# Patient Record
Sex: MALE | Race: WHITE | Employment: OTHER | URBAN - METROPOLITAN AREA
[De-identification: names, ages, dates, MRNs, and addresses within clinical notes are randomized per-mention and may not be internally consistent; named-entity substitution may affect disease eponyms.]

---

## 2017-01-17 ENCOUNTER — APPOINTMENT (OUTPATIENT)
Dept: GENERAL RADIOLOGY | Age: 67
DRG: 287 | End: 2017-01-17
Attending: STUDENT IN AN ORGANIZED HEALTH CARE EDUCATION/TRAINING PROGRAM
Payer: MEDICARE

## 2017-01-17 ENCOUNTER — HOSPITAL ENCOUNTER (INPATIENT)
Age: 67
LOS: 2 days | Discharge: HOME | DRG: 287 | End: 2017-01-19
Attending: STUDENT IN AN ORGANIZED HEALTH CARE EDUCATION/TRAINING PROGRAM | Admitting: FAMILY MEDICINE
Payer: MEDICARE

## 2017-01-17 DIAGNOSIS — I21.4 NSTEMI (NON-ST ELEVATED MYOCARDIAL INFARCTION) (HCC): Primary | ICD-10-CM

## 2017-01-17 PROBLEM — I16.1 HYPERTENSIVE EMERGENCY: Status: ACTIVE | Noted: 2017-01-17

## 2017-01-17 LAB
ALBUMIN SERPL BCP-MCNC: 4 G/DL (ref 3.5–5)
ALBUMIN/GLOB SERPL: 1.1 {RATIO} (ref 1.1–2.2)
ALP SERPL-CCNC: 89 U/L (ref 45–117)
ALT SERPL-CCNC: 43 U/L (ref 12–78)
ANION GAP BLD CALC-SCNC: 12 MMOL/L (ref 5–15)
AST SERPL W P-5'-P-CCNC: 24 U/L (ref 15–37)
BASOPHILS # BLD AUTO: 0 K/UL (ref 0–0.1)
BASOPHILS # BLD: 0 % (ref 0–1)
BILIRUB SERPL-MCNC: 0.2 MG/DL (ref 0.2–1)
BUN SERPL-MCNC: 24 MG/DL (ref 6–20)
BUN/CREAT SERPL: 17 (ref 12–20)
CALCIUM SERPL-MCNC: 8.9 MG/DL (ref 8.5–10.1)
CHLORIDE SERPL-SCNC: 106 MMOL/L (ref 97–108)
CK SERPL-CCNC: 137 U/L (ref 39–308)
CO2 SERPL-SCNC: 22 MMOL/L (ref 21–32)
CREAT SERPL-MCNC: 1.41 MG/DL (ref 0.7–1.3)
EOSINOPHIL # BLD: 0.3 K/UL (ref 0–0.4)
EOSINOPHIL NFR BLD: 4 % (ref 0–7)
ERYTHROCYTE [DISTWIDTH] IN BLOOD BY AUTOMATED COUNT: 12.7 % (ref 11.5–14.5)
GLOBULIN SER CALC-MCNC: 3.7 G/DL (ref 2–4)
GLUCOSE SERPL-MCNC: 162 MG/DL (ref 65–100)
HCT VFR BLD AUTO: 39.5 % (ref 36.6–50.3)
HGB BLD-MCNC: 13.7 G/DL (ref 12.1–17)
LYMPHOCYTES # BLD AUTO: 21 % (ref 12–49)
LYMPHOCYTES # BLD: 1.5 K/UL (ref 0.8–3.5)
MCH RBC QN AUTO: 31.3 PG (ref 26–34)
MCHC RBC AUTO-ENTMCNC: 34.7 G/DL (ref 30–36.5)
MCV RBC AUTO: 90.2 FL (ref 80–99)
MONOCYTES # BLD: 0.7 K/UL (ref 0–1)
MONOCYTES NFR BLD AUTO: 10 % (ref 5–13)
NEUTS SEG # BLD: 4.7 K/UL (ref 1.8–8)
NEUTS SEG NFR BLD AUTO: 65 % (ref 32–75)
PLATELET # BLD AUTO: 193 K/UL (ref 150–400)
POTASSIUM SERPL-SCNC: 3.4 MMOL/L (ref 3.5–5.1)
PROT SERPL-MCNC: 7.7 G/DL (ref 6.4–8.2)
RBC # BLD AUTO: 4.38 M/UL (ref 4.1–5.7)
SODIUM SERPL-SCNC: 140 MMOL/L (ref 136–145)
TROPONIN I SERPL-MCNC: 0.12 NG/ML
WBC # BLD AUTO: 7.1 K/UL (ref 4.1–11.1)

## 2017-01-17 PROCEDURE — 71020 XR CHEST PA LAT: CPT

## 2017-01-17 PROCEDURE — 36415 COLL VENOUS BLD VENIPUNCTURE: CPT | Performed by: STUDENT IN AN ORGANIZED HEALTH CARE EDUCATION/TRAINING PROGRAM

## 2017-01-17 PROCEDURE — 74011250637 HC RX REV CODE- 250/637: Performed by: FAMILY MEDICINE

## 2017-01-17 PROCEDURE — 74011250636 HC RX REV CODE- 250/636: Performed by: FAMILY MEDICINE

## 2017-01-17 PROCEDURE — 80053 COMPREHEN METABOLIC PANEL: CPT | Performed by: STUDENT IN AN ORGANIZED HEALTH CARE EDUCATION/TRAINING PROGRAM

## 2017-01-17 PROCEDURE — 74011250636 HC RX REV CODE- 250/636: Performed by: STUDENT IN AN ORGANIZED HEALTH CARE EDUCATION/TRAINING PROGRAM

## 2017-01-17 PROCEDURE — 99285 EMERGENCY DEPT VISIT HI MDM: CPT

## 2017-01-17 PROCEDURE — 65610000006 HC RM INTENSIVE CARE

## 2017-01-17 PROCEDURE — 93005 ELECTROCARDIOGRAM TRACING: CPT

## 2017-01-17 PROCEDURE — 84484 ASSAY OF TROPONIN QUANT: CPT | Performed by: STUDENT IN AN ORGANIZED HEALTH CARE EDUCATION/TRAINING PROGRAM

## 2017-01-17 PROCEDURE — 82550 ASSAY OF CK (CPK): CPT | Performed by: STUDENT IN AN ORGANIZED HEALTH CARE EDUCATION/TRAINING PROGRAM

## 2017-01-17 PROCEDURE — 85025 COMPLETE CBC W/AUTO DIFF WBC: CPT | Performed by: STUDENT IN AN ORGANIZED HEALTH CARE EDUCATION/TRAINING PROGRAM

## 2017-01-17 RX ORDER — ONDANSETRON 2 MG/ML
4 INJECTION INTRAMUSCULAR; INTRAVENOUS
Status: DISCONTINUED | OUTPATIENT
Start: 2017-01-17 | End: 2017-01-19 | Stop reason: HOSPADM

## 2017-01-17 RX ORDER — METOPROLOL TARTRATE 25 MG/1
25 TABLET, FILM COATED ORAL 2 TIMES DAILY
Status: ON HOLD | COMMUNITY
End: 2017-01-19

## 2017-01-17 RX ORDER — ACETAMINOPHEN 325 MG/1
650 TABLET ORAL
Status: DISCONTINUED | OUTPATIENT
Start: 2017-01-17 | End: 2017-01-19 | Stop reason: HOSPADM

## 2017-01-17 RX ORDER — HYDRALAZINE HYDROCHLORIDE 20 MG/ML
10 INJECTION INTRAMUSCULAR; INTRAVENOUS
Status: DISCONTINUED | OUTPATIENT
Start: 2017-01-17 | End: 2017-01-19 | Stop reason: HOSPADM

## 2017-01-17 RX ORDER — PANTOPRAZOLE SODIUM 20 MG/1
20 TABLET, DELAYED RELEASE ORAL DAILY
COMMUNITY

## 2017-01-17 RX ORDER — PANTOPRAZOLE SODIUM 40 MG/1
40 TABLET, DELAYED RELEASE ORAL DAILY
Status: DISCONTINUED | OUTPATIENT
Start: 2017-01-18 | End: 2017-01-19 | Stop reason: HOSPADM

## 2017-01-17 RX ORDER — ASPIRIN 81 MG/1
81 TABLET ORAL DAILY
COMMUNITY

## 2017-01-17 RX ORDER — THERA TABS 400 MCG
1 TAB ORAL DAILY
COMMUNITY

## 2017-01-17 RX ORDER — HEPARIN SODIUM 5000 [USP'U]/ML
5000 INJECTION, SOLUTION INTRAVENOUS; SUBCUTANEOUS EVERY 8 HOURS
Status: DISCONTINUED | OUTPATIENT
Start: 2017-01-17 | End: 2017-01-18

## 2017-01-17 RX ORDER — SODIUM CHLORIDE 0.9 % (FLUSH) 0.9 %
5-10 SYRINGE (ML) INJECTION AS NEEDED
Status: DISCONTINUED | OUTPATIENT
Start: 2017-01-17 | End: 2017-01-19 | Stop reason: HOSPADM

## 2017-01-17 RX ORDER — ATORVASTATIN CALCIUM 10 MG/1
10 TABLET, FILM COATED ORAL
Status: DISCONTINUED | OUTPATIENT
Start: 2017-01-17 | End: 2017-01-19 | Stop reason: HOSPADM

## 2017-01-17 RX ORDER — MULTIVIT WITH MINERALS/HERBS
1 TABLET ORAL DAILY
COMMUNITY

## 2017-01-17 RX ORDER — HYDROCODONE BITARTRATE AND ACETAMINOPHEN 5; 325 MG/1; MG/1
1 TABLET ORAL
Status: DISCONTINUED | OUTPATIENT
Start: 2017-01-17 | End: 2017-01-19 | Stop reason: HOSPADM

## 2017-01-17 RX ORDER — SODIUM CHLORIDE 0.9 % (FLUSH) 0.9 %
5-10 SYRINGE (ML) INJECTION EVERY 8 HOURS
Status: DISCONTINUED | OUTPATIENT
Start: 2017-01-17 | End: 2017-01-19 | Stop reason: HOSPADM

## 2017-01-17 RX ORDER — ATORVASTATIN CALCIUM 20 MG/1
10 TABLET, FILM COATED ORAL
COMMUNITY

## 2017-01-17 RX ORDER — METOPROLOL TARTRATE 50 MG/1
50 TABLET ORAL 2 TIMES DAILY
Status: DISCONTINUED | OUTPATIENT
Start: 2017-01-18 | End: 2017-01-18

## 2017-01-17 RX ORDER — METOPROLOL TARTRATE 25 MG/1
25 TABLET, FILM COATED ORAL 2 TIMES DAILY
Status: DISCONTINUED | OUTPATIENT
Start: 2017-01-18 | End: 2017-01-17

## 2017-01-17 RX ORDER — MORPHINE SULFATE 2 MG/ML
2 INJECTION, SOLUTION INTRAMUSCULAR; INTRAVENOUS
Status: DISCONTINUED | OUTPATIENT
Start: 2017-01-17 | End: 2017-01-19 | Stop reason: HOSPADM

## 2017-01-17 RX ORDER — SODIUM CHLORIDE 9 MG/ML
75 INJECTION, SOLUTION INTRAVENOUS CONTINUOUS
Status: DISPENSED | OUTPATIENT
Start: 2017-01-17 | End: 2017-01-18

## 2017-01-17 RX ORDER — AMLODIPINE BESYLATE 5 MG/1
5 TABLET ORAL DAILY
Status: DISCONTINUED | OUTPATIENT
Start: 2017-01-18 | End: 2017-01-18

## 2017-01-17 RX ORDER — POTASSIUM CHLORIDE 750 MG/1
20 TABLET, FILM COATED, EXTENDED RELEASE ORAL DAILY
Status: DISCONTINUED | OUTPATIENT
Start: 2017-01-17 | End: 2017-01-19

## 2017-01-17 RX ORDER — ASPIRIN 81 MG/1
81 TABLET ORAL DAILY
Status: DISCONTINUED | OUTPATIENT
Start: 2017-01-18 | End: 2017-01-19 | Stop reason: HOSPADM

## 2017-01-17 RX ADMIN — POTASSIUM CHLORIDE 20 MEQ: 750 TABLET, FILM COATED, EXTENDED RELEASE ORAL at 23:34

## 2017-01-17 RX ADMIN — HEPARIN SODIUM 5000 UNITS: 5000 INJECTION, SOLUTION INTRAVENOUS; SUBCUTANEOUS at 23:33

## 2017-01-17 RX ADMIN — SODIUM CHLORIDE 75 ML/HR: 900 INJECTION, SOLUTION INTRAVENOUS at 23:34

## 2017-01-17 RX ADMIN — SODIUM CHLORIDE 1000 ML: 900 INJECTION, SOLUTION INTRAVENOUS at 22:20

## 2017-01-17 RX ADMIN — NITROGLYCERIN 1 INCH: 20 OINTMENT TOPICAL at 23:33

## 2017-01-17 RX ADMIN — ATORVASTATIN CALCIUM 10 MG: 10 TABLET, FILM COATED ORAL at 23:33

## 2017-01-17 NOTE — IP AVS SNAPSHOT
[address] 
[phone] Patient: Joe Malone MRN: [id number] HUV:[id number] You are allergic to the following Allergen Reactions Egg Hives \"egg whites\" Recent Documentation Height Weight BMI Smoking Status 1.727 m 93.6 kg 31.38 kg/m2 Current Some Day Smoker Emergency Contacts Name Discharge Info Relation Home Work Mobile Laya Angel DISCHARGE CAREGIVER [3] Daughter [21] [phone] About your hospitalization You were admitted on:  January 17, 2017 You last received care in the:  [facility] 4 IMCU 2 You were discharged on:  January 19, 2017 Unit phone number:  [phone] Why you were hospitalized Your primary diagnosis was:  Not on File Your diagnoses also included:  Hypertensive Emergency Providers Seen During Your Hospitalizations Provider Role Specialty Primary office phone Amanda Bradford MD Attending Provider Emergency Medicine [phone] Darrick Salcido MD Attending Provider [facility] [phone] Nargis Fleming MD Attending Provider Hospitalist [phone] Your Primary Care Physician (PCP) Primary Care Physician Office Phone Office Fax Geeta Hanson [phone] Follow-up Information Follow up With Details Comments Contact Info Dedra Cummins MD Schedule an appointment as soon as possible for a visit in 1 week  Ifeanyi Bowman 25 100 [facility] [address] [place] [address] 
[phone] [facility]  Call [facility] to make an appointment to re-enroll in program. [phone] Current Discharge Medication List  
  
START taking these medications Dose & Instructions Dispensing Information Comments Morning Noon Evening Bedtime ALPRAZolam 0.5 mg tablet Commonly known as:  Yoan Ferraro Your next dose is: Today, Tomorrow Other:  _________ Dose:  0.5 mg Take 1 Tab by mouth two (2) times daily as needed for Anxiety. Max Daily Amount: 1 mg. Quantity:  12 Tab Refills:  0  
     
   
   
   
  
 amLODIPine 10 mg tablet Commonly known as:  Marti Lisseth Your next dose is: Today, Tomorrow Other:  _________ Dose:  10 mg Take 1 Tab by mouth daily. Quantity:  30 Tab Refills:  0  
     
   
   
   
  
 carvedilol 12.5 mg tablet Commonly known as:  Aidan Gut Your next dose is: Today, Tomorrow Other:  _________ Dose:  12.5 mg Take 1 Tab by mouth every twelve (12) hours. Quantity:  60 Tab Refills:  0 CONTINUE these medications which have NOT CHANGED Dose & Instructions Dispensing Information Comments Morning Noon Evening Bedtime  
 aspirin delayed-release 81 mg tablet Your next dose is: Today, Tomorrow Other:  _________ Dose:  81 mg Take 81 mg by mouth daily. Refills:  0  
     
   
   
   
  
 atorvastatin 20 mg tablet Commonly known as:  LIPITOR Your next dose is: Today, Tomorrow Other:  _________ Dose:  10 mg Take 10 mg by mouth nightly. Refills:  0  
     
   
   
   
  
 B COMPLEX 1 tablet Generic drug:  b complex vitamins Your next dose is: Today, Tomorrow Other:  _________ Dose:  1 Tab Take 1 Tab by mouth daily. Refills:  0  
     
   
   
   
  
 fish oil-omega-3 fatty acids 340-1,000 mg capsule Your next dose is: Today, Tomorrow Other:  _________ Dose:  1 Cap Take 1 Cap by mouth daily. Refills:  0  
     
   
   
   
  
 pantoprazole 20 mg tablet Commonly known as:  PROTONIX Your next dose is: Today, Tomorrow Other:  _________ Dose:  20 mg Take 20 mg by mouth daily. Refills:  0 THERA tablet Generic drug:  therapeutic multivitamin Your next dose is: Today, Tomorrow Other:  _________ Dose:  1 Tab Take 1 Tab by mouth daily. Refills:  0 STOP taking these medications   
 metoprolol tartrate 25 mg tablet Commonly known as:  LOPRESSOR Where to Get Your Medications Information on where to get these meds will be given to you by the nurse or doctor. ! Ask your nurse or doctor about these medications ALPRAZolam 0.5 mg tablet  
 amLODIPine 10 mg tablet  
 carvedilol 12.5 mg tablet Discharge Instructions Discharge Instructions PATIENT ID: Kareen Jade MRN: [id number] YOB: 1950 DATE OF ADMISSION: 1/17/2017  7:35 PM   
DATE OF DISCHARGE: 1/19/2017 PRIMARY CARE PROVIDER: Filipe Cash MD  
 
ATTENDING PHYSICIAN: Mitzie Boeck, MD 
DISCHARGING PROVIDER: Mitzie Boeck, MD   
To contact this individual call [phone] and ask the  to page. If unavailable ask to be transferred the Adult Hospitalist Department. DISCHARGE DIAGNOSES hypertension CONSULTATIONS: IP CONSULT TO CARDIOLOGY 
IP CONSULT TO CARDIOLOGY 
IP CONSULT TO HOSPITALIST 
 
PROCEDURES/SURGERIES: * No surgery found * PENDING TEST RESULTS:  
At the time of discharge the following test results are still pending: n/a FOLLOW UP APPOINTMENTS:  
Follow-up Information Follow up With Details Comments Contact Info Filipe Cevallos MD Schedule an appointment as soon as possible for a visit in 1 week  Juju Jensen 100 [facility] [address] [place] [address] 
[phone] ADDITIONAL CARE RECOMMENDATIONS:  
You were prescribed a new blood pressure medication, amlodipine. Your metoprolol dose was increased.  
It's important to take your blood pressure at least once a day during rest and keep a log of your readings. Follow-up with your doctor to adjust the medications as needed. DIET: Cardiac Diet ACTIVITY: Activity as tolerated, no heavy lifting for the next 2 days DISCHARGE MEDICATIONS: 
 See Medication Reconciliation Form · It is important that you take the medication exactly as they are prescribed. · Keep your medication in the bottles provided by the pharmacist and keep a list of the medication names, dosages, and times to be taken in your wallet. · Do not take other medications without consulting your doctor. NOTIFY YOUR PHYSICIAN FOR ANY OF THE FOLLOWING:  
Fever over 101 degrees for 24 hours. Chest pain, shortness of breath, fever, chills, nausea, vomiting, diarrhea, change in mentation, falling, weakness, bleeding. Severe pain or pain not relieved by medications. Or, any other signs or symptoms that you may have questions about. DISPOSITION: 
x  Home With: 
 OT  PT  [facility]  RN  
  
 SNF/Inpatient Rehab/LTAC Independent/assisted living Hospice Other: CDMP Checked:  
Yes x PROBLEM LIST Updated: 
Yes x Signed:  
Tona Ratliff MD 
1/19/2017 
10:16 AM 
 
 
Discharge Orders None INCIDE Announcement We are excited to announce that we are making your provider's discharge notes available to you in INCIDE. You will see these notes when they are completed and signed by the physician that discharged you from your recent hospital stay. If you have any questions or concerns about any information you see in INCIDE, please call the Health Information Department where you were seen or reach out to your Primary Care Provider for more information about your plan of care. Introducing [facility]! Cristy Mcdowell introduces INCIDE patient portal. Now you can access parts of your medical record, email your doctor's office, and request medication refills online.    
 
1. In your internet browser, go to [email or web address] 2. Click on the First Time User? Click Here link in the Sign In box. You will see the New Member Sign Up page. 3. Enter your Kaiima Access Code exactly as it appears below. You will not need to use this code after youve completed the sign-up process. If you do not sign up before the expiration date, you must request a new code. · Kaiima Access Code: [email or web address] Expires: 4/17/2017  7:57 PM 
 
4. Enter the last four digits of your Social Security Number (xxxx) and Date of Birth (mm/dd/yyyy) as indicated and click Submit. You will be taken to the next sign-up page. 5. Create a Kaiima ID. This will be your Kaiima login ID and cannot be changed, so think of one that is secure and easy to remember. 6. Create a Kaiima password. You can change your password at any time. 7. Enter your Password Reset Question and Answer. This can be used at a later time if you forget your password. 8. Enter your e-mail address. You will receive e-mail notification when new information is available in [address] 9. Click Sign Up. You can now view and download portions of your medical record. 10. Click the Download Summary menu link to download a portable copy of your medical information. If you have questions, please visit the Frequently Asked Questions section of the Kaiima website. Remember, Kaiima is NOT to be used for urgent needs. For medical emergencies, dial 911. Now available from your iPhone and Android! General Information Please provide this summary of care documentation to your next provider. Patient Signature:  ____________________________________________________________ Date:  ____________________________________________________________  
  
[facility] [place] Provider Signature:  ____________________________________________________________ Date:  ____________________________________________________________

## 2017-01-18 LAB
ANION GAP BLD CALC-SCNC: 7 MMOL/L (ref 5–15)
ATRIAL RATE: 88 BPM
BASOPHILS # BLD AUTO: 0 K/UL (ref 0–0.1)
BASOPHILS # BLD: 1 % (ref 0–1)
BUN SERPL-MCNC: 20 MG/DL (ref 6–20)
BUN/CREAT SERPL: 16 (ref 12–20)
CALCIUM SERPL-MCNC: 8.5 MG/DL (ref 8.5–10.1)
CALCULATED P AXIS, ECG09: 54 DEGREES
CALCULATED R AXIS, ECG10: 22 DEGREES
CALCULATED T AXIS, ECG11: 67 DEGREES
CHLORIDE SERPL-SCNC: 109 MMOL/L (ref 97–108)
CHOLEST SERPL-MCNC: 114 MG/DL
CO2 SERPL-SCNC: 24 MMOL/L (ref 21–32)
CREAT SERPL-MCNC: 1.24 MG/DL (ref 0.7–1.3)
CRP SERPL-MCNC: <0.29 MG/DL (ref 0–0.6)
DIAGNOSIS, 93000: NORMAL
EOSINOPHIL # BLD: 0.3 K/UL (ref 0–0.4)
EOSINOPHIL NFR BLD: 4 % (ref 0–7)
ERYTHROCYTE [DISTWIDTH] IN BLOOD BY AUTOMATED COUNT: 12.8 % (ref 11.5–14.5)
GLUCOSE SERPL-MCNC: 117 MG/DL (ref 65–100)
HCT VFR BLD AUTO: 36.5 % (ref 36.6–50.3)
HDLC SERPL-MCNC: 49 MG/DL
HDLC SERPL: 2.3 {RATIO} (ref 0–5)
HGB BLD-MCNC: 12.5 G/DL (ref 12.1–17)
LDLC SERPL CALC-MCNC: 51.6 MG/DL (ref 0–100)
LIPID PROFILE,FLP: NORMAL
LYMPHOCYTES # BLD AUTO: 34 % (ref 12–49)
LYMPHOCYTES # BLD: 2.2 K/UL (ref 0.8–3.5)
MCH RBC QN AUTO: 31.2 PG (ref 26–34)
MCHC RBC AUTO-ENTMCNC: 34.2 G/DL (ref 30–36.5)
MCV RBC AUTO: 91 FL (ref 80–99)
MONOCYTES # BLD: 0.4 K/UL (ref 0–1)
MONOCYTES NFR BLD AUTO: 7 % (ref 5–13)
NEUTS SEG # BLD: 3.5 K/UL (ref 1.8–8)
NEUTS SEG NFR BLD AUTO: 54 % (ref 32–75)
P-R INTERVAL, ECG05: 144 MS
PLATELET # BLD AUTO: 183 K/UL (ref 150–400)
POTASSIUM SERPL-SCNC: 3.9 MMOL/L (ref 3.5–5.1)
Q-T INTERVAL, ECG07: 376 MS
QRS DURATION, ECG06: 94 MS
QTC CALCULATION (BEZET), ECG08: 454 MS
RBC # BLD AUTO: 4.01 M/UL (ref 4.1–5.7)
SODIUM SERPL-SCNC: 140 MMOL/L (ref 136–145)
TRIGL SERPL-MCNC: 67 MG/DL (ref ?–150)
TROPONIN I SERPL-MCNC: 3.15 NG/ML
VENTRICULAR RATE, ECG03: 88 BPM
VLDLC SERPL CALC-MCNC: 13.4 MG/DL
WBC # BLD AUTO: 6.4 K/UL (ref 4.1–11.1)

## 2017-01-18 PROCEDURE — 4A023N7 MEASUREMENT OF CARDIAC SAMPLING AND PRESSURE, LEFT HEART, PERCUTANEOUS APPROACH: ICD-10-PCS | Performed by: INTERNAL MEDICINE

## 2017-01-18 PROCEDURE — 80061 LIPID PANEL: CPT | Performed by: FAMILY MEDICINE

## 2017-01-18 PROCEDURE — C1760 CLOSURE DEV, VASC: HCPCS

## 2017-01-18 PROCEDURE — 99152 MOD SED SAME PHYS/QHP 5/>YRS: CPT

## 2017-01-18 PROCEDURE — 74011250636 HC RX REV CODE- 250/636: Performed by: INTERNAL MEDICINE

## 2017-01-18 PROCEDURE — C1894 INTRO/SHEATH, NON-LASER: HCPCS

## 2017-01-18 PROCEDURE — 74011000250 HC RX REV CODE- 250: Performed by: INTERNAL MEDICINE

## 2017-01-18 PROCEDURE — 36415 COLL VENOUS BLD VENIPUNCTURE: CPT | Performed by: FAMILY MEDICINE

## 2017-01-18 PROCEDURE — 77030013744

## 2017-01-18 PROCEDURE — B2181ZZ FLUOROSCOPY OF LEFT INTERNAL MAMMARY BYPASS GRAFT USING LOW OSMOLAR CONTRAST: ICD-10-PCS | Performed by: INTERNAL MEDICINE

## 2017-01-18 PROCEDURE — 74011250637 HC RX REV CODE- 250/637: Performed by: INTERNAL MEDICINE

## 2017-01-18 PROCEDURE — 84484 ASSAY OF TROPONIN QUANT: CPT | Performed by: FAMILY MEDICINE

## 2017-01-18 PROCEDURE — 65660000000 HC RM CCU STEPDOWN

## 2017-01-18 PROCEDURE — 77030004533 HC CATH ANGI DX IMP BSC -B

## 2017-01-18 PROCEDURE — B4181ZZ FLUOROSCOPY OF BILATERAL RENAL ARTERIES USING LOW OSMOLAR CONTRAST: ICD-10-PCS | Performed by: INTERNAL MEDICINE

## 2017-01-18 PROCEDURE — 86140 C-REACTIVE PROTEIN: CPT | Performed by: FAMILY MEDICINE

## 2017-01-18 PROCEDURE — 74011250637 HC RX REV CODE- 250/637: Performed by: HOSPITALIST

## 2017-01-18 PROCEDURE — 74011250637 HC RX REV CODE- 250/637: Performed by: FAMILY MEDICINE

## 2017-01-18 PROCEDURE — B2131ZZ FLUOROSCOPY OF MULTIPLE CORONARY ARTERY BYPASS GRAFTS USING LOW OSMOLAR CONTRAST: ICD-10-PCS | Performed by: INTERNAL MEDICINE

## 2017-01-18 PROCEDURE — 85025 COMPLETE CBC W/AUTO DIFF WBC: CPT | Performed by: FAMILY MEDICINE

## 2017-01-18 PROCEDURE — 80048 BASIC METABOLIC PNL TOTAL CA: CPT | Performed by: FAMILY MEDICINE

## 2017-01-18 PROCEDURE — B2111ZZ FLUOROSCOPY OF MULTIPLE CORONARY ARTERIES USING LOW OSMOLAR CONTRAST: ICD-10-PCS | Performed by: INTERNAL MEDICINE

## 2017-01-18 PROCEDURE — 74011636320 HC RX REV CODE- 636/320: Performed by: INTERNAL MEDICINE

## 2017-01-18 RX ORDER — SODIUM CHLORIDE 9 MG/ML
1.5 INJECTION, SOLUTION INTRAVENOUS CONTINUOUS
Status: DISPENSED | OUTPATIENT
Start: 2017-01-18 | End: 2017-01-18

## 2017-01-18 RX ORDER — HYDRALAZINE HYDROCHLORIDE 20 MG/ML
10 INJECTION INTRAMUSCULAR; INTRAVENOUS ONCE
Status: COMPLETED | OUTPATIENT
Start: 2017-01-18 | End: 2017-01-18

## 2017-01-18 RX ORDER — SODIUM CHLORIDE 0.9 % (FLUSH) 0.9 %
5-10 SYRINGE (ML) INJECTION AS NEEDED
Status: DISCONTINUED | OUTPATIENT
Start: 2017-01-18 | End: 2017-01-18

## 2017-01-18 RX ORDER — SODIUM CHLORIDE 0.9 % (FLUSH) 0.9 %
5-10 SYRINGE (ML) INJECTION AS NEEDED
Status: DISCONTINUED | OUTPATIENT
Start: 2017-01-18 | End: 2017-01-19 | Stop reason: HOSPADM

## 2017-01-18 RX ORDER — GUAIFENESIN 100 MG/5ML
81 LIQUID (ML) ORAL
Status: COMPLETED | OUTPATIENT
Start: 2017-01-18 | End: 2017-01-18

## 2017-01-18 RX ORDER — METOPROLOL TARTRATE 50 MG/1
50 TABLET ORAL EVERY 12 HOURS
Status: DISCONTINUED | OUTPATIENT
Start: 2017-01-18 | End: 2017-01-19

## 2017-01-18 RX ORDER — HEPARIN SODIUM 200 [USP'U]/100ML
2000 INJECTION, SOLUTION INTRAVENOUS AS NEEDED
Status: DISCONTINUED | OUTPATIENT
Start: 2017-01-18 | End: 2017-01-18

## 2017-01-18 RX ORDER — SODIUM CHLORIDE 0.9 % (FLUSH) 0.9 %
5-10 SYRINGE (ML) INJECTION EVERY 8 HOURS
Status: DISCONTINUED | OUTPATIENT
Start: 2017-01-18 | End: 2017-01-19 | Stop reason: HOSPADM

## 2017-01-18 RX ORDER — LIDOCAINE HYDROCHLORIDE 10 MG/ML
10-30 INJECTION INFILTRATION; PERINEURAL
Status: DISCONTINUED | OUTPATIENT
Start: 2017-01-18 | End: 2017-01-18

## 2017-01-18 RX ORDER — HEPARIN SODIUM 1000 [USP'U]/ML
1000-5000 INJECTION, SOLUTION INTRAVENOUS; SUBCUTANEOUS
Status: DISCONTINUED | OUTPATIENT
Start: 2017-01-18 | End: 2017-01-18

## 2017-01-18 RX ORDER — SODIUM CHLORIDE 0.9 % (FLUSH) 0.9 %
5-10 SYRINGE (ML) INJECTION EVERY 8 HOURS
Status: DISCONTINUED | OUTPATIENT
Start: 2017-01-18 | End: 2017-01-18

## 2017-01-18 RX ORDER — FENTANYL CITRATE 50 UG/ML
25-200 INJECTION, SOLUTION INTRAMUSCULAR; INTRAVENOUS
Status: DISCONTINUED | OUTPATIENT
Start: 2017-01-18 | End: 2017-01-18

## 2017-01-18 RX ORDER — MIDAZOLAM HYDROCHLORIDE 1 MG/ML
.5-1 INJECTION, SOLUTION INTRAMUSCULAR; INTRAVENOUS
Status: DISCONTINUED | OUTPATIENT
Start: 2017-01-18 | End: 2017-01-18

## 2017-01-18 RX ORDER — ATROPINE SULFATE 0.1 MG/ML
.5-1 INJECTION INTRAVENOUS AS NEEDED
Status: DISCONTINUED | OUTPATIENT
Start: 2017-01-18 | End: 2017-01-18

## 2017-01-18 RX ORDER — SODIUM CHLORIDE 9 MG/ML
3 INJECTION, SOLUTION INTRAVENOUS CONTINUOUS
Status: DISPENSED | OUTPATIENT
Start: 2017-01-18 | End: 2017-01-18

## 2017-01-18 RX ORDER — METOPROLOL TARTRATE 5 MG/5ML
5 INJECTION INTRAVENOUS ONCE
Status: COMPLETED | OUTPATIENT
Start: 2017-01-18 | End: 2017-01-18

## 2017-01-18 RX ORDER — AMLODIPINE BESYLATE 5 MG/1
10 TABLET ORAL DAILY
Status: DISCONTINUED | OUTPATIENT
Start: 2017-01-18 | End: 2017-01-19 | Stop reason: HOSPADM

## 2017-01-18 RX ORDER — CLOPIDOGREL 300 MG/1
600 TABLET, FILM COATED ORAL ONCE
Status: DISCONTINUED | OUTPATIENT
Start: 2017-01-18 | End: 2017-01-18

## 2017-01-18 RX ADMIN — AMLODIPINE BESYLATE 10 MG: 5 TABLET ORAL at 08:40

## 2017-01-18 RX ADMIN — PANTOPRAZOLE SODIUM 40 MG: 40 TABLET, DELAYED RELEASE ORAL at 08:40

## 2017-01-18 RX ADMIN — SODIUM CHLORIDE 1.5 ML/KG/HR: 900 INJECTION, SOLUTION INTRAVENOUS at 07:48

## 2017-01-18 RX ADMIN — ATORVASTATIN CALCIUM 10 MG: 10 TABLET, FILM COATED ORAL at 21:07

## 2017-01-18 RX ADMIN — METOPROLOL TARTRATE 50 MG: 50 TABLET ORAL at 21:07

## 2017-01-18 RX ADMIN — Medication 81 MG: at 07:02

## 2017-01-18 RX ADMIN — IOPAMIDOL 1226 ML: 755 INJECTION, SOLUTION INTRAVENOUS at 07:28

## 2017-01-18 RX ADMIN — METOPROLOL TARTRATE 5 MG: 5 INJECTION INTRAVENOUS at 07:08

## 2017-01-18 RX ADMIN — MIDAZOLAM HYDROCHLORIDE 2 MG: 1 INJECTION, SOLUTION INTRAMUSCULAR; INTRAVENOUS at 07:18

## 2017-01-18 RX ADMIN — SODIUM CHLORIDE 3 ML/KG/HR: 900 INJECTION, SOLUTION INTRAVENOUS at 06:55

## 2017-01-18 RX ADMIN — FENTANYL CITRATE 50 MCG: 50 INJECTION, SOLUTION INTRAMUSCULAR; INTRAVENOUS at 07:07

## 2017-01-18 RX ADMIN — MIDAZOLAM HYDROCHLORIDE 2 MG: 1 INJECTION, SOLUTION INTRAMUSCULAR; INTRAVENOUS at 07:07

## 2017-01-18 RX ADMIN — HEPARIN SODIUM 2000 UNITS: 200 INJECTION, SOLUTION INTRAVENOUS at 07:04

## 2017-01-18 RX ADMIN — SODIUM CHLORIDE 1.5 ML/KG/HR: 900 INJECTION, SOLUTION INTRAVENOUS at 09:59

## 2017-01-18 RX ADMIN — METOPROLOL TARTRATE 50 MG: 50 TABLET ORAL at 08:40

## 2017-01-18 RX ADMIN — LIDOCAINE HYDROCHLORIDE 10 ML: 10 INJECTION, SOLUTION INFILTRATION; PERINEURAL at 07:04

## 2017-01-18 RX ADMIN — HYDRALAZINE HYDROCHLORIDE 10 MG: 20 INJECTION INTRAMUSCULAR; INTRAVENOUS at 07:14

## 2017-01-18 NOTE — PROGRESS NOTES
Admission Medication Reconciliation:    Information obtained from: Patient    Significant PMH/Disease States:   Past Medical History   Diagnosis Date    CAD (coronary artery disease)     Hypertension        Chief Complaint for this Admission:    Chief Complaint   Patient presents with    Dizziness    Hypertension       Allergies:  Egg    Prior to Admission Medications:   Prior to Admission Medications   Prescriptions Last Dose Informant Patient Reported? Taking?   aspirin delayed-release 81 mg tablet 1/17/2017 at Unknown time  Yes Yes   Sig: Take 81 mg by mouth daily. atorvastatin (LIPITOR) 20 mg tablet 1/16/2017 at Unknown time  Yes Yes   Sig: Take 10 mg by mouth nightly. b complex vitamins (B COMPLEX 1) tablet 1/17/2017 at Unknown time  Yes Yes   Sig: Take 1 Tab by mouth daily. fish oil-omega-3 fatty acids 340-1,000 mg capsule   Yes Yes   Sig: Take 1 Cap by mouth daily. metoprolol tartrate (LOPRESSOR) 25 mg tablet 1/17/2017 at am  Yes Yes   Sig: Take 25 mg by mouth two (2) times a day. pantoprazole (PROTONIX) 20 mg tablet 1/17/2017 at Unknown time  Yes Yes   Sig: Take 20 mg by mouth daily. therapeutic multivitamin (THERA) tablet   Yes Yes   Sig: Take 1 Tab by mouth daily. Facility-Administered Medications: None         Comments/Recommendations:     Spoke with patient regarding allergies and PTA medications. 1) Reviewed and confirmed allergy. 2) Updated PTA medication list. Added ASA, atorvastatin, B complex, fish oil, metoprolol, pantoprazole, and multivitamin. Patient took doses of ASA, B complex, metoprolol (1 dose), and pantoprazole today.       Rosalva Cha, PharmD

## 2017-01-18 NOTE — ED NOTES
Daughter is Burak Mejia, [phone]; daughter will be waiting for patient once cardiac cath is completed

## 2017-01-18 NOTE — PROGRESS NOTES
Hospitalist Progress Note      Hospital summary: 74 y.o man with HTN, CAD s/p CABG, who presented with hypertensive urgency. Assessment/Plan:  Hypertensive urgency: continue metoprolol and amlodipine    Elevated troponin: s/p LHC showing 70% LM lesion, see report, cardiology following    KAYLEIGH: improving with IV fluids    Code status: full  DVT prophylaxis: SCDs, ambulate  Disposition: home likely tomorrow  ----------------------------------------------    CC: hypertension    S: no chest pain, dyspnea, headache, dizziness, n/v/d. Review of Systems:  Pertinent items are noted in HPI.     O:  Visit Vitals    /53 (BP 1 Location: Right arm, BP Patient Position: At rest)    Pulse 74    Temp 97.9 °F (36.6 °C)    Resp 18    Ht 5' 8\" (1.727 m)    Wt 90.7 kg (200 lb)    SpO2 98%    BMI 30.41 kg/m2       PHYSICAL EXAM:  Gen: NAD, non-toxic  HEENT: anicteric sclerae, normal conjunctiva  Neck: supple, trachea midline, no adenopathy  Heart: RRR, systolic murmur, no JVD, no peripheral edema  Lungs: CTA b/l, non-labored respirations  Abd: soft, NT, ND, BS+  Extr: warm, non-edematous  Neuro: grossly non-focal  Psych: normal mood, appropriate affect      Intake/Output Summary (Last 24 hours) at 01/18/17 1421  Last data filed at 01/18/17 0958   Gross per 24 hour   Intake              240 ml   Output              325 ml   Net              -85 ml        Recent labs & imaging reviewed:  Recent Labs      01/18/17 0351 01/17/17 1955   WBC  6.4  7.1   HGB  12.5  13.7   HCT  36.5*  39.5   PLT  183  193     Recent Labs      01/18/17   0351 01/17/17 1955   NA  140  140   K  3.9  3.4*   CL  109*  106   CO2  24  22   BUN  20  24*   CREA  1.24  1.41*   GLU  117*  162*   CA  8.5  8.9     Recent Labs      01/17/17 1955   SGOT  24   ALT  43   AP  89   TBILI  0.2   TP  7.7   ALB  4.0   GLOB  3.7       Med list reviewed  Current Facility-Administered Medications   Medication Dose Route Frequency    sodium chloride (NS) flush 5-10 mL  5-10 mL IntraVENous Q8H    sodium chloride (NS) flush 5-10 mL  5-10 mL IntraVENous PRN    amLODIPine (NORVASC) tablet 10 mg  10 mg Oral DAILY    aspirin delayed-release tablet 81 mg  81 mg Oral DAILY    atorvastatin (LIPITOR) tablet 10 mg  10 mg Oral QHS    pantoprazole (PROTONIX) tablet 40 mg  40 mg Oral DAILY    sodium chloride (NS) flush 5-10 mL  5-10 mL IntraVENous Q8H    sodium chloride (NS) flush 5-10 mL  5-10 mL IntraVENous PRN    acetaminophen (TYLENOL) tablet 650 mg  650 mg Oral Q4H PRN    HYDROcodone-acetaminophen (NORCO) 5-325 mg per tablet 1 Tab  1 Tab Oral Q4H PRN    morphine injection 2 mg  2 mg IntraVENous Q4H PRN    ondansetron (ZOFRAN) injection 4 mg  4 mg IntraVENous Q4H PRN    hydrALAZINE (APRESOLINE) 20 mg/mL injection 10 mg  10 mg IntraVENous Q4H PRN    0.9% sodium chloride infusion  75 mL/hr IntraVENous CONTINUOUS    metoprolol tartrate (LOPRESSOR) tablet 50 mg  50 mg Oral BID    potassium chloride SR (KLOR-CON 10) tablet 20 mEq  20 mEq Oral DAILY       Care Plan discussed with:  Patient/Family    Jesus Ortiz MD  Internal Medicine  Date of Service: 1/18/2017

## 2017-01-18 NOTE — ED TRIAGE NOTES
Pt was at home and states that about an hour ago he began to feel dizzy and light headed, ,pt states that he felt his pressure was up and that he was having palpitations. Upon EMS arrival his BP was 230/180 and . Pt denies any chest pain or SOB.

## 2017-01-18 NOTE — PROGRESS NOTES
Patient assisted to ambulate to bathroom to void; exhibits steady gait. Right groin site remains dry & intact.

## 2017-01-18 NOTE — CARDIO/PULMONARY
Cardiac Wellness: CAD education folder to bedside of Shane Damian. Met with Shane Damian and his daughter to provide education. Pt. stated that Dr Sandra Armijo did say he had some heart muscle damage so Tori MI education added to folder. Educated using teach back method. Reviewed CAD diagnosis definition and purpose of intervention. This is a previous diagnosis for Shane Damian, he has a excellent understanding of CAD diagnosis and treatment. Identified pertinent CAD risk factors including, previous CABG, HTN, high cholesterol, metabolic syndrome. Patient is a cigar smoker when he golfs which is about 3 x/week. Smoking cessation material offered but declined. Reviewed importance of medication compliance and follow up appointments with cardiologist.  Discussed purpose of amlodipine and potential side effects, signs and symptoms to report to physician after discharge. Emphasized value of cardiac rehab, discussed Cardiac Wellness Program and encouraged enrollment. Shane Dmaian is in the Phase 3 program at [facility] where he has attended since his CABG . Did advise that he get clearance to return to Cardiac [facility] at his cardiology follow up visit. Shane Daiman and his daughter verbalized understanding with questions answered.     Harriett Mckeon RN

## 2017-01-18 NOTE — ED PROVIDER NOTES
HPI Comments: 74 y.o. male with past medical history significant for HTN, CAD, and coronary artery bypass graft who presents via EMS with chief complaint of dizziness. Pt c/o dizziness and light-headedness that onset  2.5 hours ago that he believes is attributed to his high BP. Pt states that about 15 days ago his BP was 150/70, and then 13 days ago it was 160/70 when he took it at [facility] rehab clinic. Pt states that he brought this up to his PCP, and his dosage of metoprolol was changed from 25mg/day to 50mg/day. Pt states that his BP has remained high since then, and attributed it to the medication taking some time to adjust. Pt states that yesterday he checked his BP at [facility], and it was high, and the pt felt a flushed feeling, \"similar to a head buzz. \" Pt claims that 2 hours ago, he felt a similar flushed feeling, and was concerned he may pass out. Since the pt lives alone, he contacted EMS for fear of LOC home alone. Pt states his normal BP ranges from 130/70 to 140/70, and his normal HR is in the 60s. Pt states he does drink 1-2 cups coffee in the morning, and will have occasional [facility], which he had one with dinner tonight. Pt claims he works out at rehab facility 2-3 times a week, as well as plays golf several times a week. Pt denies any recent changes in diet or activity. There are no other acute medical concerns at this time. Social hx: Cigar smoker. No ETOH use. No recreational drug use. PCP: No primary care provider on file. Cardiologist: Yadira Arriaga MD     Note written by Naga Mancini, as dictated by Ranjit Cagle MD 8:54 PM        The history is provided by the patient. No  was used. Past Medical History:   Diagnosis Date    CAD (coronary artery disease)     Hypertension        Past Surgical History:   Procedure Laterality Date    Hx coronary artery bypass graft           History reviewed. No pertinent family history.     Social History Social History    Marital status: N/A     Spouse name: N/A    Number of children: N/A    Years of education: N/A     Occupational History    Not on file. Social History Main Topics    Smoking status: Current Some Day Smoker    Smokeless tobacco: Not on file    Alcohol use No    Drug use: No    Sexual activity: Not on file     Other Topics Concern    Not on file     Social History Narrative    No narrative on file         ALLERGIES: Egg    Review of Systems   Constitutional: Negative for activity change, chills, diaphoresis, fatigue and fever. HENT: Negative for congestion, rhinorrhea, sinus pressure, sore throat, trouble swallowing and voice change. Eyes: Negative for photophobia and visual disturbance. Respiratory: Negative for cough, chest tightness and shortness of breath. Cardiovascular: Negative for chest pain, palpitations and leg swelling. Gastrointestinal: Negative for abdominal pain, blood in stool, constipation, diarrhea, nausea and vomiting. Musculoskeletal: Negative for arthralgias, myalgias and neck pain. Neurological: Positive for dizziness and light-headedness. Negative for weakness, numbness and headaches. All other systems reviewed and are negative. Vitals:    01/17/17 1950   BP: 191/66   Pulse: 95   Resp: 15   Temp: 98.5 °F (36.9 °C)   SpO2: 97%   Weight: 90.7 kg (200 lb)   Height: 5' 8\" (1.727 m)            Physical Exam   Constitutional: He is oriented to person, place, and time. He appears well-developed and well-nourished. No distress. HENT:   Head: Normocephalic and atraumatic. Nose: Nose normal.   Mouth/Throat: Oropharynx is clear and moist. No oropharyngeal exudate. Eyes: Conjunctivae and EOM are normal. Right eye exhibits no discharge. Left eye exhibits no discharge. No scleral icterus. Neck: Normal range of motion. Neck supple. No JVD present. No tracheal deviation present. No thyromegaly present.    Cardiovascular: Normal rate, regular rhythm, normal heart sounds and intact distal pulses. Exam reveals no gallop and no friction rub. No murmur heard. Pulmonary/Chest: Effort normal and breath sounds normal. No stridor. No respiratory distress. He has no wheezes. He has no rales. He exhibits no tenderness. Abdominal: Bowel sounds are normal. He exhibits no distension and no mass. There is no tenderness. There is no rebound. Musculoskeletal: Normal range of motion. He exhibits no edema or tenderness. Lymphadenopathy:     He has no cervical adenopathy. Neurological: He is alert and oriented to person, place, and time. No cranial nerve deficit. Coordination normal.   Skin: Skin is warm and dry. No rash noted. He is not diaphoretic. No erythema. No pallor. Face flushed   Psychiatric: He has a normal mood and affect. His behavior is normal. Judgment and thought content normal.   Nursing note and vitals reviewed. Note written by Naga Mann, as dictated by Brett Hernandez MD 9:05 PM      MDM  Number of Diagnoses or Management Options  NSTEMI (non-ST elevated myocardial infarction) [facility]):   Diagnosis management comments: ACS, chest pain, PNA. 75 y/o male with sig CAD hx s/p CABG with recent elevations in BP despite increase in BP meds. Will obtain cbc,cmp, ce, ecg, cxr, and will reasess. Tr +. Will consult Cardiology and pt to stay NPO for now.        Amount and/or Complexity of Data Reviewed  Clinical lab tests: ordered and reviewed  Tests in the radiology section of CPT®: ordered and reviewed  Review and summarize past medical records: yes  Discuss the patient with other providers: yes    Risk of Complications, Morbidity, and/or Mortality  Presenting problems: moderate  Diagnostic procedures: moderate  Management options: moderate    Critical Care  Total time providing critical care: 30-74 minutes (Total critical care time spend exclusive of procedures:  31 min.  )    Patient Progress  Patient progress: stable    ED Course       Procedures    ED EKG interpretation:  Rhythm: normal sinus rhythm; and regular . Rate (approx.): 88. Note written by Naga Perry, as dictated by Corky Bower MD 8:04 PM      CONSULT NOTE:  9:14 Orestes Ferro MD spoke with Dr. August Carnes, Consult for Cardiology from [facility]. Discussed available diagnostic tests and clinical findings. He is in agreement with care plans as outlined. Dr. August Carnes recommends admission. CONSULT NOTE:  10:26 PM Corky Bower MD spoke with Dr. Joel Burroughs, Consult for Hospitalist.  Discussed available diagnostic tests and clinical findings. He is in agreement with care plans as outlined. Dr. Joel Burroughs will admit.

## 2017-01-18 NOTE — CONSULTS
S CARDIOLOGY CONSULT              Date of  Admission: 1/17/2017  7:35 PM            Assessment and Plan: Castro Moseley is admitted with HTN noted to have abnormal troponin. # Elevated trop - May be due to severe HTN on presentation in setting of mild renal insuff. - Cycle markers. Will decide re LHC or stress test depending on enzymes overnight. NPO after midnight. # HTN - mild exacerbation recently. Will need additional agent. Add amlodipine 5. # Renal insuff - acute. IVF overnight. REASON FOR CONSULT: +trop  Primary Cardiologist: Gershon Nageotte, MD    HPI: 74 yom with history of CAD, CABG X 3 in 2013 here with elevated BP. He noted elevated bp in the 170 range about a week ago. Metoprolol was increased from 12.5 to 25 bid recently. Continued to have elvated bp to 170s  Today. [place] anxious and subsequently BP ray to 240s. Brought in by EMS. He denies any cp, sob. He did have 2 episodes of flushing sensation, one yesterday and one today but no pain. He played golf this morning without problems. There are no active problems to display for this patient. Kaila Tidwell MD  Past Medical History   Diagnosis Date    CAD (coronary artery disease)     Hypertension       Past Surgical History   Procedure Laterality Date    Hx coronary artery bypass graft       Allergies   Allergen Reactions    Egg Hives     \"egg whites\"      History reviewed. No pertinent family history. Social History     Social History    Marital status: SINGLE     Spouse name: N/A    Number of children: N/A    Years of education: N/A     Occupational History    Not on file.      Social History Main Topics    Smoking status: Current Some Day Smoker    Smokeless tobacco: Not on file    Alcohol use No    Drug use: No    Sexual activity: Not on file     Other Topics Concern    Not on file     Social History Narrative    No narrative on file     Current Facility-Administered Medications Medication Dose Route Frequency    sodium chloride 0.9 % bolus infusion 1,000 mL  1,000 mL IntraVENous ONCE     Current Outpatient Prescriptions   Medication Sig    aspirin delayed-release 81 mg tablet Take 81 mg by mouth daily.  pantoprazole (PROTONIX) 20 mg tablet Take 20 mg by mouth daily.  metoprolol tartrate (LOPRESSOR) 25 mg tablet Take 25 mg by mouth two (2) times a day.  therapeutic multivitamin (THERA) tablet Take 1 Tab by mouth daily.  b complex vitamins (B COMPLEX 1) tablet Take 1 Tab by mouth daily.  fish oil-omega-3 fatty acids 340-1,000 mg capsule Take 1 Cap by mouth daily.  atorvastatin (LIPITOR) 20 mg tablet Take 10 mg by mouth nightly. Review of Symptoms:  A comprehensive review of systems was negative in 11 points other than stated above in HPI. Physical Exam    Visit Vitals    /64    Pulse 78    Temp 98.5 °F (36.9 °C)    Resp 17    Ht 5' 8\" (1.727 m)    Wt 90.7 kg (200 lb)    SpO2 94%    BMI 30.41 kg/m2     Skin warm and dry  HEENT: WNL  Oropharynx without exudate. Neck supple  Lungs clear  Heart - RRR, Normal S1/ S2   No Mummurs, click or Rubs  No S3 or S4  Abdomen soft and non tender,   Pulses 2+ throughout   Neuro:  Normal facial grimace,  Moves all extremities.    AAAO   Psych: unanxious    Labs:   Recent Results (from the past 24 hour(s))   EKG, 12 LEAD, INITIAL    Collection Time: 01/17/17  7:52 PM   Result Value Ref Range    Ventricular Rate 88 BPM    Atrial Rate 88 BPM    P-R Interval 144 ms    QRS Duration 94 ms    Q-T Interval 376 ms    QTC Calculation (Bezet) 454 ms    Calculated P Axis 54 degrees    Calculated R Axis 22 degrees    Calculated T Axis 67 degrees    Diagnosis Normal sinus rhythm  No previous ECGs available      CBC WITH AUTOMATED DIFF    Collection Time: 01/17/17  7:55 PM   Result Value Ref Range    WBC 7.1 4.1 - 11.1 K/uL    RBC 4.38 4.10 - 5.70 M/uL    HGB 13.7 12.1 - 17.0 g/dL    HCT 39.5 36.6 - 50.3 %    MCV 90.2 80.0 - 99.0 FL    MCH 31.3 26.0 - 34.0 PG    MCHC 34.7 30.0 - 36.5 g/dL    RDW 12.7 11.5 - 14.5 %    PLATELET 132 301 - 148 K/uL    NEUTROPHILS 65 32 - 75 %    LYMPHOCYTES 21 12 - 49 %    MONOCYTES 10 5 - 13 %    EOSINOPHILS 4 0 - 7 %    BASOPHILS 0 0 - 1 %    ABS. NEUTROPHILS 4.7 1.8 - 8.0 K/UL    ABS. LYMPHOCYTES 1.5 0.8 - 3.5 K/UL    ABS. MONOCYTES 0.7 0.0 - 1.0 K/UL    ABS. EOSINOPHILS 0.3 0.0 - 0.4 K/UL    ABS. BASOPHILS 0.0 0.0 - 0.1 K/UL   METABOLIC PANEL, COMPREHENSIVE    Collection Time: 01/17/17  7:55 PM   Result Value Ref Range    Sodium 140 136 - 145 mmol/L    Potassium 3.4 (L) 3.5 - 5.1 mmol/L    Chloride 106 97 - 108 mmol/L    CO2 22 21 - 32 mmol/L    Anion gap 12 5 - 15 mmol/L    Glucose 162 (H) 65 - 100 mg/dL    BUN 24 (H) 6 - 20 MG/DL    Creatinine 1.41 (H) 0.70 - 1.30 MG/DL    BUN/Creatinine ratio 17 12 - 20      GFR est AA >60 >60 ml/min/1.73m2    GFR est non-AA 50 (L) >60 ml/min/1.73m2    Calcium 8.9 8.5 - 10.1 MG/DL    Bilirubin, total 0.2 0.2 - 1.0 MG/DL    ALT 43 12 - 78 U/L    AST 24 15 - 37 U/L    Alk. phosphatase 89 45 - 117 U/L    Protein, total 7.7 6.4 - 8.2 g/dL    Albumin 4.0 3.5 - 5.0 g/dL    Globulin 3.7 2.0 - 4.0 g/dL    A-G Ratio 1.1 1.1 - 2.2     TROPONIN I    Collection Time: 01/17/17  7:55 PM   Result Value Ref Range    Troponin-I, Qt. 0.12 (H) <0.05 ng/mL   CK W/ REFLX CKMB    Collection Time: 01/17/17  7:55 PM   Result Value Ref Range     39 - 308 U/L       Cardiographics    Telemetry: SR  ECG: Nonspecific ST-T changes. Echocardiogram: Normal EF previously.

## 2017-01-18 NOTE — PROGRESS NOTES
0160:  Cardiac Cath Lab Procedure Area Arrival Note:    Baltazar Freitas arrived to Cardiac Cath Lab, Procedure Area. Patient identifiers verified with NAME and DATE OF BIRTH. Procedure verified with patient. Consent forms verified. Allergies verified. Patient informed of procedure and plan of care. Questions answered with review. Patient voiced understanding of procedure and plan of care. Patient on cardiac monitor, non-invasive blood pressure, SPO2 monitor. On O2 @ 2 lpm via NC.  IV of NS on pump at 272 ml/hr. Patient status doing well without problems. Patient is A&Ox 4. Patient reports no pain. Patient medicated during procedure with orders obtained and verified by Dr. Coral Ya. Refer to patients Cardiac Cath Lab PROCEDURE REPORT for vital signs, assessment, status, and response during procedure, printed at end of case. Printed report on chart or scanned into chart. 0735:Transfer to [facility] from Procedure Area    Verbal report given to Carmen Leach RN on Baltazar Freitas being transferred to Cardiac Cath Lab RR for routine post - op   Patient is post C procedure. Patient stable upon transfer to . Report consisted of patients Situation, Background, Assessment and   Recommendations(SBAR). Information from the following report(s) Procedure Summary and MAR was reviewed with the receiving nurse. Opportunity for questions and clarification was provided. Patient medicated during procedure with orders obtained and verified by Dr. Coral Ya. Refer to patient PROCEDURE REPORT for vital signs, assessment, status, and response during procedure.

## 2017-01-18 NOTE — PROGRESS NOTES
TRANSFER - IN REPORT:    Verbal report received from Roy Ormond, RN on Nahomy Pina  being received from procedure for routine progression of care. Report consisted of patients Situation, Background, Assessment and Recommendations(SBAR). Information from the following report(s) SBAR, Procedure Summary, MAR and Recent Results was reviewed with the receiving clinician. Opportunity for questions and clarification was provided. Assessment completed upon patients arrival to [address] [place] [address] and care assumed. Cardiac Cath Lab Recovery Arrival Note:    Nahomy Pina arrived to [facility] recovery area. Patient procedure= cardiac catheterization. Patient on cardiac monitor, non-invasive blood pressure, SPO2 monitor. On O2 @ 2 lpm via nasal cannula. IV  of 0.9% normal saline on pump at 136 ml/hr. Patient status doing well without problems. Patient is A&Ox 4. Patient reports no complaints. PROCEDURE SITE CHECK:    Procedure site:without any bleeding or hematoma, no pain/discomfort reported at procedure site. No change in patient status. Continue to monitor patient and status.

## 2017-01-18 NOTE — PROGRESS NOTES
Primary Nurse Nishi Michel RN and Terrell Augustine RN performed a dual skin assessment on this patient Impairment noted.  Pt has R groin cath site, no bleeding, no hematoma, dressing CDI

## 2017-01-18 NOTE — ED NOTES
Spoke with Dr. bA Vegas regarding repeat Troponin of 3.15. Order received for repeat Troponin in 6 hours (to be completed at noon, per Ab Vegas.

## 2017-01-18 NOTE — PROCEDURES
Cardiac Catheterization Procedure Note   Patient: Nahomy Pina  MRN: [id number]  SSN: [id number]   YOB: 1950 Age: 74 y.o. Sex: male    Date of Procedure: 1/18/2017   Pre-procedure Diagnosis: NSTEMI  Post-procedure Diagnosis: Coronary Artery Disease  Procedure: Left Heart Cath with Bypass Grafts/renal angiograms  :  Dr. Mallory Richter MD    Assistant(s):  None  Anesthesia: Moderate Sedation   Estimated Blood Loss: Less than 10 mL   Specimens Removed: None  Findings: LVG; EF 55%;  LM with distal 70% lesion; RCA is totally occluded; SVG-RCA is patent; SVG-Ramus is patent; LIMA-distal LAD is patent; native OM2 is totally occluded but filled by collateral from native LAD and LIMA graft; Bilateral renals are patent  Plan; medical therapy directed to control BP better  Complications: None   Implants:  None  Signed by:  Mallory Richter MD  1/18/2017  7:34 AM

## 2017-01-18 NOTE — H&P
Krys Messer MD  Please call  and page for questions. Call physician on-call through the  7pm-7am      History & Physical    Primary Care Provider: Ksasie Reynoso MD  Source of Information: Patient and his medical record. History of Presenting Illness:   Shivani Britt is a 74 y.o. male who presented to the ED with Hypertension. He jeyson the H/O HTN, CAD, and coronary artery bypass graft who presents via EMS with chief complaint of dizziness after playing golf today. Pt usual BP is 150/70 but it has been increasing recently. Patient metoprolol was increased recently. Today when he checked his BP at home it was >220/120 and he was feeling right so he decided to come to the ED. The patient denies any fever, chills, chest pain, cough, congestion, recent illness, palpitations, or dysuria. Review of Systems:  A comprehensive review of systems was negative except for that written in the History of Present Illness. Past Medical History   Diagnosis Date    CAD (coronary artery disease)     Hypertension       Past Surgical History   Procedure Laterality Date    Hx coronary artery bypass graft       Prior to Admission medications    Medication Sig Start Date End Date Taking? Authorizing Provider   aspirin delayed-release 81 mg tablet Take 81 mg by mouth daily. Yes Historical Provider   pantoprazole (PROTONIX) 20 mg tablet Take 20 mg by mouth daily. Yes Historical Provider   metoprolol tartrate (LOPRESSOR) 25 mg tablet Take 25 mg by mouth two (2) times a day. Yes Historical Provider   therapeutic multivitamin (THERA) tablet Take 1 Tab by mouth daily. Yes Historical Provider   b complex vitamins (B COMPLEX 1) tablet Take 1 Tab by mouth daily. Yes Historical Provider   fish oil-omega-3 fatty acids 340-1,000 mg capsule Take 1 Cap by mouth daily. Yes Historical Provider   atorvastatin (LIPITOR) 20 mg tablet Take 10 mg by mouth nightly. Yes Historical Provider     Allergies   Allergen Reactions    Egg Hives     \"egg whites\"      History reviewed. No pertinent family history. SOCIAL HISTORY:  Patient resides:  Independently X   Assisted Living    SNF    With family care       Smoking history:   None X   Former    Chronic      Alcohol history:   None X   Social    Chronic      Ambulates:   Independently X   w/cane    w/walker    w/wc    CODE STATUS:  DNR    Full X   Other      Objective:     Physical Exam:     Visit Vitals    /64    Pulse 78    Temp 98.5 °F (36.9 °C)    Resp 17    Ht 5' 8\" (1.727 m)    Wt 90.7 kg (200 lb)    SpO2 94%    BMI 30.41 kg/m2      O2 Device: Room air    General:  Alert, cooperative, no distress, appears stated age. Neck: Supple, symmetrical, trachea midline. Back:   Symmetric, no curvature. ROM normal. No CVA tenderness. Lungs:   Clear to auscultation bilaterally. Chest wall:  No tenderness or deformity. Heart:  Regular rate and rhythm, S1, S2 normal, no murmur, click, rub or gallop. Abdomen:   Soft, non-tender. Bowel sounds normal. No masses,  No organomegaly. Extremities: Extremities normal, atraumatic, no cyanosis or edema. Pulses: 2+ and symmetric all extremities. Skin: Skin color, texture, turgor normal. No rashes or lesions   Neurologic: CNII-XII intact. EKG:  normal EKG, normal sinus rhythm. Data Review:     Recent Days:  Recent Labs      01/17/17 1955   WBC  7.1   HGB  13.7   HCT  39.5   PLT  193     Recent Labs      01/17/17 1955   NA  140   K  3.4*   CL  106   CO2  22   GLU  162*   BUN  24*   CREA  1.41*   CA  8.9   ALB  4.0   SGOT  24   ALT  43     No results for input(s): PH, PCO2, PO2, HCO3, FIO2 in the last 72 hours.     24 Hour Results:  Recent Results (from the past 24 hour(s))   EKG, 12 LEAD, INITIAL    Collection Time: 01/17/17  7:52 PM   Result Value Ref Range    Ventricular Rate 88 BPM    Atrial Rate 88 BPM    P-R Interval 144 ms    QRS Duration 94 ms    Q-T Interval 376 ms    QTC Calculation (Bezet) 454 ms    Calculated P Axis 54 degrees    Calculated R Axis 22 degrees    Calculated T Axis 67 degrees    Diagnosis Normal sinus rhythm  No previous ECGs available      CBC WITH AUTOMATED DIFF    Collection Time: 01/17/17  7:55 PM   Result Value Ref Range    WBC 7.1 4.1 - 11.1 K/uL    RBC 4.38 4.10 - 5.70 M/uL    HGB 13.7 12.1 - 17.0 g/dL    HCT 39.5 36.6 - 50.3 %    MCV 90.2 80.0 - 99.0 FL    MCH 31.3 26.0 - 34.0 PG    MCHC 34.7 30.0 - 36.5 g/dL    RDW 12.7 11.5 - 14.5 %    PLATELET 982 655 - 320 K/uL    NEUTROPHILS 65 32 - 75 %    LYMPHOCYTES 21 12 - 49 %    MONOCYTES 10 5 - 13 %    EOSINOPHILS 4 0 - 7 %    BASOPHILS 0 0 - 1 %    ABS. NEUTROPHILS 4.7 1.8 - 8.0 K/UL    ABS. LYMPHOCYTES 1.5 0.8 - 3.5 K/UL    ABS. MONOCYTES 0.7 0.0 - 1.0 K/UL    ABS. EOSINOPHILS 0.3 0.0 - 0.4 K/UL    ABS. BASOPHILS 0.0 0.0 - 0.1 K/UL   METABOLIC PANEL, COMPREHENSIVE    Collection Time: 01/17/17  7:55 PM   Result Value Ref Range    Sodium 140 136 - 145 mmol/L    Potassium 3.4 (L) 3.5 - 5.1 mmol/L    Chloride 106 97 - 108 mmol/L    CO2 22 21 - 32 mmol/L    Anion gap 12 5 - 15 mmol/L    Glucose 162 (H) 65 - 100 mg/dL    BUN 24 (H) 6 - 20 MG/DL    Creatinine 1.41 (H) 0.70 - 1.30 MG/DL    BUN/Creatinine ratio 17 12 - 20      GFR est AA >60 >60 ml/min/1.73m2    GFR est non-AA 50 (L) >60 ml/min/1.73m2    Calcium 8.9 8.5 - 10.1 MG/DL    Bilirubin, total 0.2 0.2 - 1.0 MG/DL    ALT 43 12 - 78 U/L    AST 24 15 - 37 U/L    Alk. phosphatase 89 45 - 117 U/L    Protein, total 7.7 6.4 - 8.2 g/dL    Albumin 4.0 3.5 - 5.0 g/dL    Globulin 3.7 2.0 - 4.0 g/dL    A-G Ratio 1.1 1.1 - 2.2     TROPONIN I    Collection Time: 01/17/17  7:55 PM   Result Value Ref Range    Troponin-I, Qt. 0.12 (H) <0.05 ng/mL   CK W/ REFLX CKMB    Collection Time: 01/17/17  7:55 PM   Result Value Ref Range     39 - 308 U/L         Imaging:     Assessment and Plan:       ACS: With elevated troponin.  Will do O2, morphine, ASA, statin, BB, nitro paste. BP is getting better. Possibly cath at AM. Trend troponin. Acute renal failure: Probably elevated BP and or volume depletion. Monitor with slow hydration. Hypertensive Urgency: BP is better now. Will admit to the ICU and follow with medications. See orders for other plans:   VTE prophylaxis: Heparin  Code status: Full  Discussed plan of care with Patient/Family and Nurse   Pre-admission lived at home:   Disposition:   Discharge planning: pending.            Signed By: Duran Beltre MD     January 17, 2017

## 2017-01-19 VITALS
TEMPERATURE: 98.2 F | OXYGEN SATURATION: 96 % | HEIGHT: 68 IN | BODY MASS INDEX: 31.27 KG/M2 | HEART RATE: 78 BPM | RESPIRATION RATE: 13 BRPM | SYSTOLIC BLOOD PRESSURE: 147 MMHG | DIASTOLIC BLOOD PRESSURE: 63 MMHG | WEIGHT: 206.35 LBS

## 2017-01-19 PROBLEM — I16.1 HYPERTENSIVE EMERGENCY: Status: RESOLVED | Noted: 2017-01-17 | Resolved: 2017-01-19

## 2017-01-19 LAB
ALBUMIN SERPL BCP-MCNC: 4.1 G/DL (ref 3.5–5)
ALBUMIN/GLOB SERPL: 1.1 {RATIO} (ref 1.1–2.2)
ALP SERPL-CCNC: 66 U/L (ref 45–117)
ALT SERPL-CCNC: 40 U/L (ref 12–78)
ANION GAP BLD CALC-SCNC: 16 MMOL/L (ref 5–15)
ANION GAP BLD CALC-SCNC: 9 MMOL/L (ref 5–15)
AST SERPL W P-5'-P-CCNC: 23 U/L (ref 15–37)
BILIRUB SERPL-MCNC: 0.7 MG/DL (ref 0.2–1)
BUN SERPL-MCNC: 17 MG/DL (ref 6–20)
BUN SERPL-MCNC: 17 MG/DL (ref 6–20)
BUN/CREAT SERPL: 13 (ref 12–20)
BUN/CREAT SERPL: 13 (ref 12–20)
CALCIUM SERPL-MCNC: 9 MG/DL (ref 8.5–10.1)
CALCIUM SERPL-MCNC: 9.6 MG/DL (ref 8.5–10.1)
CHLORIDE SERPL-SCNC: 102 MMOL/L (ref 97–108)
CHLORIDE SERPL-SCNC: 115 MMOL/L (ref 97–108)
CO2 SERPL-SCNC: 14 MMOL/L (ref 21–32)
CO2 SERPL-SCNC: 25 MMOL/L (ref 21–32)
CREAT SERPL-MCNC: 1.26 MG/DL (ref 0.7–1.3)
CREAT SERPL-MCNC: 1.32 MG/DL (ref 0.7–1.3)
GLOBULIN SER CALC-MCNC: 3.6 G/DL (ref 2–4)
GLUCOSE SERPL-MCNC: 110 MG/DL (ref 65–100)
GLUCOSE SERPL-MCNC: 167 MG/DL (ref 65–100)
POTASSIUM SERPL-SCNC: 3.7 MMOL/L (ref 3.5–5.1)
POTASSIUM SERPL-SCNC: 5 MMOL/L (ref 3.5–5.1)
PROT SERPL-MCNC: 7.7 G/DL (ref 6.4–8.2)
SODIUM SERPL-SCNC: 136 MMOL/L (ref 136–145)
SODIUM SERPL-SCNC: 145 MMOL/L (ref 136–145)
TROPONIN I SERPL-MCNC: 0.75 NG/ML

## 2017-01-19 PROCEDURE — 74011250636 HC RX REV CODE- 250/636: Performed by: FAMILY MEDICINE

## 2017-01-19 PROCEDURE — 36415 COLL VENOUS BLD VENIPUNCTURE: CPT | Performed by: INTERNAL MEDICINE

## 2017-01-19 PROCEDURE — 74011250637 HC RX REV CODE- 250/637: Performed by: FAMILY MEDICINE

## 2017-01-19 PROCEDURE — 80053 COMPREHEN METABOLIC PANEL: CPT | Performed by: HOSPITALIST

## 2017-01-19 PROCEDURE — 80048 BASIC METABOLIC PNL TOTAL CA: CPT | Performed by: HOSPITALIST

## 2017-01-19 PROCEDURE — 84484 ASSAY OF TROPONIN QUANT: CPT | Performed by: INTERNAL MEDICINE

## 2017-01-19 PROCEDURE — 74011250637 HC RX REV CODE- 250/637: Performed by: HOSPITALIST

## 2017-01-19 PROCEDURE — 74011250637 HC RX REV CODE- 250/637: Performed by: INTERNAL MEDICINE

## 2017-01-19 RX ORDER — CARVEDILOL 12.5 MG/1
12.5 TABLET ORAL EVERY 12 HOURS
Status: DISCONTINUED | OUTPATIENT
Start: 2017-01-19 | End: 2017-01-19 | Stop reason: HOSPADM

## 2017-01-19 RX ORDER — AMLODIPINE BESYLATE 10 MG/1
10 TABLET ORAL DAILY
Qty: 30 TAB | Refills: 0 | Status: SHIPPED | OUTPATIENT
Start: 2017-01-19

## 2017-01-19 RX ORDER — METOPROLOL TARTRATE 25 MG/1
25 TABLET, FILM COATED ORAL EVERY 12 HOURS
Qty: 60 TAB | Refills: 0 | Status: SHIPPED | OUTPATIENT
Start: 2017-01-19 | End: 2017-01-19

## 2017-01-19 RX ORDER — ALPRAZOLAM 0.5 MG/1
0.5 TABLET ORAL
Qty: 12 TAB | Refills: 0 | Status: SHIPPED | OUTPATIENT
Start: 2017-01-19 | End: 2017-02-03

## 2017-01-19 RX ORDER — CARVEDILOL 12.5 MG/1
12.5 TABLET ORAL EVERY 12 HOURS
Qty: 60 TAB | Refills: 0 | Status: SHIPPED | OUTPATIENT
Start: 2017-01-19

## 2017-01-19 RX ORDER — ALPRAZOLAM 0.5 MG/1
0.5 TABLET ORAL
Status: DISCONTINUED | OUTPATIENT
Start: 2017-01-19 | End: 2017-01-19 | Stop reason: HOSPADM

## 2017-01-19 RX ADMIN — PANTOPRAZOLE SODIUM 40 MG: 40 TABLET, DELAYED RELEASE ORAL at 09:08

## 2017-01-19 RX ADMIN — HYDRALAZINE HYDROCHLORIDE 10 MG: 20 INJECTION INTRAMUSCULAR; INTRAVENOUS at 10:24

## 2017-01-19 RX ADMIN — METOPROLOL TARTRATE 50 MG: 50 TABLET ORAL at 09:08

## 2017-01-19 RX ADMIN — ASPIRIN 81 MG: 81 TABLET, COATED ORAL at 09:08

## 2017-01-19 RX ADMIN — CARVEDILOL 12.5 MG: 12.5 TABLET, FILM COATED ORAL at 11:55

## 2017-01-19 RX ADMIN — ALPRAZOLAM 0.5 MG: 0.5 TABLET ORAL at 12:05

## 2017-01-19 RX ADMIN — AMLODIPINE BESYLATE 10 MG: 5 TABLET ORAL at 09:08

## 2017-01-19 NOTE — PROGRESS NOTES
Problem: Falls - Risk of  Goal: *Absence of falls  Outcome: Progressing Towards Goal  Pt is absent of falls. Call bell within reach. Bed in lowest and locked position.

## 2017-01-19 NOTE — CARDIO/PULMONARY
Cardiac Wellness: [facility] contact information placed on the AVS. He is a long time participant and will re-enroll there.  Beulah Gifford RN

## 2017-01-19 NOTE — PROGRESS NOTES
I have reviewed discharge instructions with the patient. The patient verbalized understanding. Opportunity for questions given. RN removed patient PIV and telemetry monitoring. Medication education given and reviewed with patient regarding new medications at discharge. Follow up appt to be made by patient. Patient awaiting volunteer for discharge.

## 2017-01-19 NOTE — DISCHARGE SUMMARY
Discharge Summary     Patient: Joe Malone MRN: [id number]  SSN: [id number]    YOB: 1950  Age: 74 y.o. Sex: male       Admit Date: 1/17/2017    Discharge Date: 1/19/2017      Admission Diagnoses: Hypertensive emergency    Discharge Diagnoses:    Malignant hypertension    Chronic diagnoses:  CAD, s/p CABG  HTN    Discharge Condition: Stable    Hospital Course: 74 y.o man with HTN, CAD s/p CABG, who presented with hypertensive urgency. He had an elevated troponin-I (peaked at 3.15). Cardiology was consulted and performed coronary angiography (see report below).     Hypertensive urgency: metoprolol dose increased to 50 mg q12h and amlodipine 10 mg daily was added to his regimen. He remained hypertensive, so metoprolol was changed to Coreg 12.5 mg q12h. The patient thinks his pressure is driven by anxiety from being here, which I agree with. He was given a small dose of alprazolam and this improved his blood pressure as well. He was given a small prescription of Xanax with understanding that this is only to be used for severe anxiety and will not be a long-term medication.     Elevated troponin: suspect supply-demand mismatch from malignant hypertension in the setting of underlying CAD; s/p LHC showing 70% LM lesion, see report     Possible KAYLEIGH: Cr 1.41 on admission; no baseline value available here. Down-trended to 1.26. He may have CKD stage 2 from hypertension. Consults: Cardiology    Significant Diagnostic Studies:   Coronary angiography:  Findings: LVG; EF 55%; LM with distal 70% lesion; RCA is totally occluded; SVG-RCA is patent; SVG-Ramus is patent; LIMA-distal LAD is patent; native OM2 is totally occluded but filled by collateral from native LAD and LIMA graft; Bilateral renals are patent  Plan; medical therapy directed to control BP better    Disposition: home    S: no complaints; denies chest pain, dyspnea, headache, palpitations, dizziness. Feels ready to go home.  States his BP's are elevated here because he has white coat syndrome. O:   Visit Vitals    /77 (BP 1 Location: Right arm, BP Patient Position: At rest)    Pulse 85    Temp 98.8 °F (37.1 °C)    Resp 16    Ht 5' 8\" (1.727 m)    Wt 93.6 kg (206 lb 5.6 oz)    SpO2 98%    BMI 31.38 kg/m2     NAD  RRR  Systolic murmur no JVD no edema  Lungs CTA resp non-labored  No peripheral ade      Discharge Medications:   Current Discharge Medication List      START taking these medications    Details   amLODIPine (NORVASC) 10 mg tablet Take 1 Tab by mouth daily. Qty: 30 Tab, Refills: 0      ALPRAZolam (XANAX) 0.5 mg tablet Take 1 Tab by mouth two (2) times daily as needed for Anxiety. Max Daily Amount: 1 mg. Qty: 12 Tab, Refills: 0      carvedilol (COREG) 12.5 mg tablet Take 1 Tab by mouth every twelve (12) hours. Qty: 60 Tab, Refills: 0         CONTINUE these medications which have NOT CHANGED    Details   aspirin delayed-release 81 mg tablet Take 81 mg by mouth daily. pantoprazole (PROTONIX) 20 mg tablet Take 20 mg by mouth daily. therapeutic multivitamin (THERA) tablet Take 1 Tab by mouth daily. b complex vitamins (B COMPLEX 1) tablet Take 1 Tab by mouth daily. fish oil-omega-3 fatty acids 340-1,000 mg capsule Take 1 Cap by mouth daily. atorvastatin (LIPITOR) 20 mg tablet Take 10 mg by mouth nightly.          STOP taking these medications       metoprolol tartrate (LOPRESSOR) 25 mg tablet Comments:   Reason for Stopping:               FOLLOW UP APPOINTMENTS:   Follow-up Information     Follow up With Details Comments Contact Info    Skip Jones., MD Schedule an appointment as soon as possible for a visit in 1 week  Kei Brown [address] [facility]  [address]  [phone]      [facility]  Call [facility] to make an appointment to re-enroll in program. [phone]           Signed By: Mckay Walsh MD     January 19, 2017      Greater than 30 minutes spent on patient care and discharge management.

## 2017-01-19 NOTE — PROGRESS NOTES
pts /77-MD-am BP meds given-pt stated he is anxious and will tell me when to re take the BP when he is more relax  BP re check 177/79- ,180's-hydralazine given MD made aware of anxiety -Xanax  given ,coreg started per order   Latest BP -140's -pt dc home- no unt s/s

## 2017-01-19 NOTE — DISCHARGE INSTRUCTIONS
Discharge Instructions       PATIENT ID: Dilia Kitchen  MRN: [id number]   YOB: 1950    DATE OF ADMISSION: 1/17/2017  7:35 PM    DATE OF DISCHARGE: 1/19/2017    PRIMARY CARE PROVIDER: Mabel Vega MD     ATTENDING PHYSICIAN: Aidan Ortiz MD  DISCHARGING PROVIDER: Aidan Ortiz MD    To contact this individual call [phone] and ask the  to page. If unavailable ask to be transferred the Adult Hospitalist Department. DISCHARGE DIAGNOSES hypertension    CONSULTATIONS: IP CONSULT TO CARDIOLOGY  IP CONSULT TO CARDIOLOGY  IP CONSULT TO HOSPITALIST    PROCEDURES/SURGERIES: * No surgery found *    PENDING TEST RESULTS:   At the time of discharge the following test results are still pending: n/a    FOLLOW UP APPOINTMENTS:   Follow-up Information     Follow up With Details Comments Naz Bryan MD Schedule an appointment as soon as possible for a visit in 1 week  Sangita Brown [address] [place] [address] [facility]  [place] [phone]             ADDITIONAL CARE RECOMMENDATIONS:   You were prescribed a new blood pressure medication, amlodipine. Your metoprolol dose was increased. It's important to take your blood pressure at least once a day during rest and keep a log of your readings. Follow-up with your doctor to adjust the medications as needed. DIET: Cardiac Diet    ACTIVITY: Activity as tolerated, no heavy lifting for the next 2 days    DISCHARGE MEDICATIONS:   See Medication Reconciliation Form    · It is important that you take the medication exactly as they are prescribed. · Keep your medication in the bottles provided by the pharmacist and keep a list of the medication names, dosages, and times to be taken in your wallet. · Do not take other medications without consulting your doctor. NOTIFY YOUR PHYSICIAN FOR ANY OF THE FOLLOWING:   Fever over 101 degrees for 24 hours.    Chest pain, shortness of breath, fever, chills, nausea, vomiting, diarrhea, change in mentation, falling, weakness, bleeding. Severe pain or pain not relieved by medications. Or, any other signs or symptoms that you may have questions about.       DISPOSITION:  x  Home With:   OT  PT  HH  RN       SNF/Inpatient Rehab/LTAC    Independent/assisted living    Hospice    Other:     CDMP Checked:   Yes x     PROBLEM LIST Updated:  Yes x       Signed:   Maru Way MD  1/19/2017  10:16 AM

## 2017-01-20 ENCOUNTER — TELEPHONE (OUTPATIENT)
Dept: CARDIAC REHAB | Age: 67
End: 2017-01-20

## 2017-02-03 ENCOUNTER — HOSPITAL ENCOUNTER (EMERGENCY)
Age: 67
Discharge: HOME | End: 2017-02-03
Attending: EMERGENCY MEDICINE
Payer: MEDICARE

## 2017-02-03 VITALS
RESPIRATION RATE: 13 BRPM | TEMPERATURE: 98.1 F | DIASTOLIC BLOOD PRESSURE: 60 MMHG | SYSTOLIC BLOOD PRESSURE: 149 MMHG | WEIGHT: 211.5 LBS | HEIGHT: 68 IN | HEART RATE: 67 BPM | OXYGEN SATURATION: 98 % | BODY MASS INDEX: 32.05 KG/M2

## 2017-02-03 LAB
ATRIAL RATE: 75 BPM
CALCULATED P AXIS, ECG09: 59 DEGREES
CALCULATED R AXIS, ECG10: 19 DEGREES
CALCULATED T AXIS, ECG11: 17 DEGREES
DIAGNOSIS, 93000: NORMAL
P-R INTERVAL, ECG05: 154 MS
Q-T INTERVAL, ECG07: 394 MS
QRS DURATION, ECG06: 98 MS
QTC CALCULATION (BEZET), ECG08: 439 MS
TROPONIN I SERPL-MCNC: <0.04 NG/ML
VENTRICULAR RATE, ECG03: 75 BPM

## 2017-02-03 PROCEDURE — 84484 ASSAY OF TROPONIN QUANT: CPT | Performed by: EMERGENCY MEDICINE

## 2017-02-03 PROCEDURE — 93005 ELECTROCARDIOGRAM TRACING: CPT

## 2017-02-03 PROCEDURE — 36415 COLL VENOUS BLD VENIPUNCTURE: CPT | Performed by: EMERGENCY MEDICINE

## 2017-02-03 PROCEDURE — 99285 EMERGENCY DEPT VISIT HI MDM: CPT

## 2017-02-03 NOTE — ED NOTES
Bedside and Verbal shift change report given to Rudi Mendez RN (oncoming nurse) by Lola Eli RN (offgoing nurse). Report included the following information SBAR, Kardex and ED Summary.

## 2017-02-03 NOTE — ED TRIAGE NOTES
Pt stated he is dealing with anxiety and last night his heart rate was racing last night, pt rambling non stop in triage, denies fever, denies n/v, denies cp, denies sob

## 2017-02-03 NOTE — ED NOTES
Pt placed on monitor x 3, side rails up x 1, call light placed within reach, bed in lowest and locked position, pt changed into gown, pt declined warm blanket at this time.

## 2017-02-03 NOTE — ED PROVIDER NOTES
HPI Comments: 74 y.o. male with past medical history significant for hypertension and CAD who presents from home with chief complaint of hypertension. Pt states that he he had a bypass 3 years ago and has had no difficulties with his heart since then until 1 month ago. He says 1 month ago he started noticing his blood pressure was started to get higher. He then saw a cardiologist who then doubled his metoprolol. He says that 2 weeks ago he was \"feeling weird\" and called EMS. In transit his BP was 252/181. He was seen by Dr. Lauren Polo who did a cardiac catheterization that was normal. He says that they decided to put him on a pure blood pressure medication. He says that he has been checking his pressure since then, but thinks it keeps going up when he tests it because of his anxiety. Pt presents today saying his BP has not gone down yet and last night he felt \"like his heart beat was in his temples\". He denies any CP or HA. There are no other acute medical concerns at this time. PCP: Osvaldo Gee MD    Note written by Naga Trinh, as dictated by Marilyn Mcclure MD 9:29 AM      The history is provided by the patient. No  was used. Past Medical History:   Diagnosis Date    CAD (coronary artery disease)     Hypertension        Past Surgical History:   Procedure Laterality Date    Hx coronary artery bypass graft           History reviewed. No pertinent family history. Social History     Social History    Marital status: SINGLE     Spouse name: N/A    Number of children: N/A    Years of education: N/A     Occupational History    Not on file.      Social History Main Topics    Smoking status: Current Some Day Smoker    Smokeless tobacco: Not on file    Alcohol use No    Drug use: No    Sexual activity: Not on file     Other Topics Concern    Not on file     Social History Narrative         ALLERGIES: Egg    Review of Systems   Constitutional: Negative for appetite change, chills and fever. HENT: Negative for rhinorrhea, sore throat and trouble swallowing. Eyes: Negative for photophobia. Respiratory: Negative for cough and shortness of breath. Cardiovascular: Negative for chest pain and palpitations. Gastrointestinal: Negative for abdominal pain, nausea and vomiting. Genitourinary: Negative for dysuria, frequency and hematuria. Musculoskeletal: Negative for arthralgias. Neurological: Negative for dizziness, syncope, weakness and headaches. Psychiatric/Behavioral: Negative for behavioral problems. The patient is nervous/anxious. All other systems reviewed and are negative. Vitals:    02/03/17 0834 02/03/17 0854 02/03/17 0900   BP: (!) 208/97 190/71 161/70   Pulse: 83  73   Resp: 20  19   Temp: 98 °F (36.7 °C)     SpO2: 99% 91% 97%   Weight: 95.9 kg (211 lb 8 oz)     Height: 5' 8\" (1.727 m)              Physical Exam   Constitutional: He appears well-developed and well-nourished. José Miguel complexion   HENT:   Head: Normocephalic and atraumatic. Mouth/Throat: Oropharynx is clear and moist.   Eyes: EOM are normal. Pupils are equal, round, and reactive to light. Neck: Normal range of motion. Neck supple. Cardiovascular: Normal rate, regular rhythm, normal heart sounds and intact distal pulses. Exam reveals no gallop and no friction rub. No murmur heard. Pulmonary/Chest: Effort normal. No respiratory distress. He has no wheezes. He has no rales. Abdominal: Soft. There is no tenderness. There is no rebound. Musculoskeletal: Normal range of motion. He exhibits no tenderness. Neurological: He is alert. No cranial nerve deficit. Motor; symmetric   Skin: No erythema. Psychiatric: He has a normal mood and affect. His behavior is normal.   Nursing note and vitals reviewed.    Note written by Naga Barraza, as dictated by Jeremiah Morrell MD 9:29 AM      [facility]  ED Course       Procedures                       ED EKG interpretation:  Rhythm: normal sinus rhythm; and regular . Rate (approx.): 75; Axis: normal; P wave: normal; QRS interval: normal ; ST/T wave: normal; in  Lead: ; Other findings: . This EKG was interpreted by Leo Garcia MD,ED Provider.  10:02 AM

## 2017-02-03 NOTE — DISCHARGE INSTRUCTIONS
High Blood Pressure: Care Instructions  Your Care Instructions  If your blood pressure is usually above 140/90, you have high blood pressure, or hypertension. That means the top number is 140 or higher or the bottom number is 90 or higher, or both. Despite what a lot of people think, high blood pressure usually doesn't cause headaches or make you feel dizzy or lightheaded. It usually has no symptoms. But it does increase your risk for heart attack, stroke, and kidney or eye damage. The higher your blood pressure, the more your risk increases. Your doctor will give you a goal for your blood pressure. Your goal will be based on your health and your age. An example of a goal is to keep your blood pressure below 140/90. Lifestyle changes, such as eating healthy and being active, are always important to help lower blood pressure. You might also take medicine to reach your blood pressure goal.  Follow-up care is a key part of your treatment and safety. Be sure to make and go to all appointments, and call your doctor if you are having problems. It's also a good idea to know your test results and keep a list of the medicines you take. How can you care for yourself at home? Medical treatment  · If you stop taking your medicine, your blood pressure will go back up. You may take one or more types of medicine to lower your blood pressure. Be safe with medicines. Take your medicine exactly as prescribed. Call your doctor if you think you are having a problem with your medicine. · Talk to your doctor before you start taking aspirin every day. Aspirin can help certain people lower their risk of a heart attack or stroke. But taking aspirin isn't right for everyone, because it can cause serious bleeding. · See your doctor regularly. You may need to see the doctor more often at first or until your blood pressure comes down.   · If you are taking blood pressure medicine, talk to your doctor before you take decongestants or anti-inflammatory medicine, such as ibuprofen. Some of these medicines can raise blood pressure. · Learn how to check your blood pressure at home. Lifestyle changes  · Stay at a healthy weight. This is especially important if you put on weight around the waist. Losing even 10 pounds can help you lower your blood pressure. · If your doctor recommends it, get more exercise. Walking is a good choice. Bit by bit, increase the amount you walk every day. Try for at least 30 minutes on most days of the week. You also may want to swim, bike, or do other activities. · Avoid or limit alcohol. Talk to your doctor about whether you can drink any alcohol. · Try to limit how much sodium you eat to less than 2,300 milligrams (mg) a day. Your doctor may ask you to try to eat less than 1,500 mg a day. · Eat plenty of fruits (such as bananas and oranges), vegetables, legumes, whole grains, and low-fat dairy products. · Lower the amount of saturated fat in your diet. Saturated fat is found in animal products such as milk, cheese, and meat. Limiting these foods may help you lose weight and also lower your risk for heart disease. · Do not smoke. Smoking increases your risk for heart attack and stroke. If you need help quitting, talk to your doctor about stop-smoking programs and medicines. These can increase your chances of quitting for good. When should you call for help? Call 911 anytime you think you may need emergency care. This may mean having symptoms that suggest that your blood pressure is causing a serious heart or blood vessel problem. Your blood pressure may be over 180/110. For example, call 911 if:  · You have symptoms of a heart attack. These may include:  ¨ Chest pain or pressure, or a strange feeling in the chest.  ¨ Sweating. ¨ Shortness of breath. ¨ Nausea or vomiting. ¨ Pain, pressure, or a strange feeling in the back, neck, jaw, or upper belly or in one or both shoulders or arms.   ¨ Lightheadedness or sudden weakness. ¨ A fast or irregular heartbeat. · You have symptoms of a stroke. These may include:  ¨ Sudden numbness, tingling, weakness, or loss of movement in your face, arm, or leg, especially on only one side of your body. ¨ Sudden vision changes. ¨ Sudden trouble speaking. ¨ Sudden confusion or trouble understanding simple statements. ¨ Sudden problems with walking or balance. ¨ A sudden, severe headache that is different from past headaches. · You have severe back or belly pain. Do not wait until your blood pressure comes down on its own. Get help right away. Call your doctor now or seek immediate care if:  · Your blood pressure is much higher than normal (such as 180/110 or higher), but you don't have symptoms. · You think high blood pressure is causing symptoms, such as:  ¨ Severe headache. ¨ Blurry vision. Watch closely for changes in your health, and be sure to contact your doctor if:  · Your blood pressure measures 140/90 or higher at least 2 times. That means the top number is 140 or higher or the bottom number is 90 or higher, or both. · You think you may be having side effects from your blood pressure medicine. · Your blood pressure is usually normal, but it goes above normal at least 2 times. Where can you learn more? Go to http://susan-juan.info/. Enter E339 in the search box to learn more about \"High Blood Pressure: Care Instructions. \"  Current as of: August 8, 2016  Content Version: 11.1  © 6614-3994 [employer]. Care instructions adapted under license by [employer] (which disclaims liability or warranty for this information). If you have questions about a medical condition or this instruction, always ask your healthcare professional. [place] [address] any warranty or liability for your use of this information. We hope that we have addressed all of your medical concerns.  The examination and treatment you received in the Emergency Department were for an emergent problem and were not intended as complete care. It is important that you follow up with your healthcare provider(s) for ongoing care. If your symptoms worsen or do not improve as expected, and you are unable to reach your usual health care provider(s), you should return to the Emergency Department. Today's healthcare is undergoing tremendous change, and patient satisfaction surveys are one of the many tools to assess the quality of medical care. You may receive a survey from the [employer] regarding your experience in the Emergency Department. I hope that your experience has been completely positive, particularly the medical care that I provided. As such, please participate in the survey; anything less than excellent does not meet my expectations or intentions. [facility] and [facility] participate in nationally recognized quality of care measures. If your blood pressure is greater than 120/80, as reported below, we urge that you seek medical care to address the potential of high blood pressure, commonly known as hypertension. Hypertension can be hereditary or can be caused by certain medical conditions, pain, stress, or \"white coat syndrome. \"       Please make an appointment with your health care provider(s) for follow up of your Emergency Department visit. VITALS:   Patient Vitals for the past 8 hrs:   Temp Pulse Resp BP SpO2   02/03/17 0945 - 63 17 130/57 95 %   02/03/17 0930 - 67 16 130/58 96 %   02/03/17 0915 - 76 16 155/62 97 %   02/03/17 0900 - 73 19 161/70 97 %   02/03/17 0854 - - - 190/71 91 %   02/03/17 0834 98 °F (36.7 °C) 83 20 (!) 208/97 99 %          Thank you for allowing us to provide you with medical care today. We realize that you have many choices for your emergency care needs. Please choose us in the future for any continued health care needs.       Regards, Mynor Valverde MD    [facility], [facility].   Office: [phone]            Recent Results (from the past 24 hour(s))   EKG, 12 LEAD, INITIAL    Collection Time: 02/03/17  8:42 AM   Result Value Ref Range    Ventricular Rate 75 BPM    Atrial Rate 75 BPM    P-R Interval 154 ms    QRS Duration 98 ms    Q-T Interval 394 ms    QTC Calculation (Bezet) 439 ms    Calculated P Axis 59 degrees    Calculated R Axis 19 degrees    Calculated T Axis 17 degrees    Diagnosis       Normal sinus rhythm  When compared with ECG of 17-JAN-2017 19:52,  No significant change was found     TROPONIN I    Collection Time: 02/03/17  9:08 AM   Result Value Ref Range    Troponin-I, Qt. <0.04 <0.05 ng/mL       No results found.

## 2017-03-02 NOTE — CALL BACK NOTE
[facility] Emergency Department Follow Up Call Record    Discharged to : Home/Family Home/Home Health/Skilled Facility/Rehab/Assisted Living/Other__home_____  1) Did you receive your discharge instructions? Yes        2) Do you understand them? Yes         3) Are you able to follow them? Yes   Reports doing well       If NO, what can I clarify for you? 4) Do you understand your diagnosis? Yes         5) Do you know which symptoms should prompt you to call the doctor? Yes     6) Were you able to fill and  any medications that were prescribed? Not applicable     7) You were prescribed __n/a_________for ____________________. Common side effects of this medication are____________________. This is not a complete list so please review the forms given from the pharmacy for a complete list.      8) Are there any questions about your medications? Not applicable            Have you scheduled any recommended doctors appointments (specialty, PCP) Encouraged follow up with PCP  If NO, what barriers are you encountering (transportation/lost contact info/cost/  didnt think necessary/no PCP  9) If discharged with Home Health, has the agency contacted you to schedule visit? No  10) Is there anyone available to help you at home (meals, errands, transportation    monitoring) (adult children, neighbors, private duty companions) Yes    11) Are you on a special diet? No         If YES, do you understand the requirements for this diet? Education provided? 12) If presented with cough, bronchitis, COPD, asthma, is it ok to ask that the   respiratory disease management educator call you? Not applicable      13)  A) If presented with fall, were you issued an assistive device in the ED    Are you using? Not applicable  B) If given RX for device, have you obtained? Not applicable       If NO, barriers? C) Therapist recommended:   Are you able to implement the suggestions?  Not applicable        If NO, barriers to implementation? D) Are you having any difficulties with mobility inside your home?     (steps, bed, tub)No   If YES, ask if the [facility] PT can contact patient and good time and number?  14)  At the end of your discharge instructions, there is information about accessing [facility], have you had a chance to review those? Yes         Do you have any questions about signing up for this service? We encourage our patients to be active participants in their healthcare and this site is one of the ways to do that. It will allow you to access parts of your medical record, email your doctors office, schedule appointments, and request medications refills . 15) Are there any other questions that I can answer for you regarding    your Emergency department visit?  NO             Estimated Call Time:___________________ Date/Time:_______________

## 2020-01-01 NOTE — PROGRESS NOTES
TRANSFER - OUT REPORT:    Verbal report given to Orlin Carreon RN(name) on Stanislaw Blevins  being transferred to PTU(unit) for routine progression of care       Report consisted of patients Situation, Background, Assessment and   Recommendations(SBAR). Information from the following report(s) SBAR, Procedure Summary, MAR and Recent Results was reviewed with the receiving nurse. Lines:   Peripheral IV 01/17/17 Left Antecubital (Active)   Site Assessment Clean, dry, & intact 1/18/2017  7:37 AM   Phlebitis Assessment 0 1/18/2017  7:37 AM   Infiltration Assessment 0 1/18/2017  7:37 AM   Dressing Status Clean, dry, & intact 1/18/2017  7:37 AM   Dressing Type Tape;Transparent 1/18/2017  7:37 AM   Hub Color/Line Status Pink; Infusing 1/18/2017  7:37 AM        Opportunity for questions and clarification was provided.       Patient transported with:   Registered Nurse 100

## 2020-11-28 ENCOUNTER — APPOINTMENT (OUTPATIENT)
Dept: GENERAL RADIOLOGY | Age: 70
End: 2020-11-28
Attending: EMERGENCY MEDICINE
Payer: MEDICARE

## 2020-11-28 ENCOUNTER — HOSPITAL ENCOUNTER (EMERGENCY)
Age: 70
Discharge: HOME | End: 2020-11-28
Attending: EMERGENCY MEDICINE
Payer: MEDICARE

## 2020-11-28 VITALS
BODY MASS INDEX: 33.56 KG/M2 | HEART RATE: 69 BPM | TEMPERATURE: 98 F | RESPIRATION RATE: 16 BRPM | SYSTOLIC BLOOD PRESSURE: 140 MMHG | HEIGHT: 67 IN | OXYGEN SATURATION: 96 % | DIASTOLIC BLOOD PRESSURE: 51 MMHG | WEIGHT: 213.85 LBS

## 2020-11-28 DIAGNOSIS — R05.9 COUGH: Primary | ICD-10-CM

## 2020-11-28 DIAGNOSIS — J98.11 ATELECTASIS: ICD-10-CM

## 2020-11-28 PROCEDURE — 71046 X-RAY EXAM CHEST 2 VIEWS: CPT

## 2020-11-28 PROCEDURE — 99283 EMERGENCY DEPT VISIT LOW MDM: CPT

## 2020-11-28 RX ORDER — AZITHROMYCIN 500 MG/1
TABLET, FILM COATED ORAL
Qty: 3 TAB | Refills: 0 | Status: SHIPPED | OUTPATIENT
Start: 2020-11-28

## 2020-11-28 NOTE — ED PROVIDER NOTES
EMERGENCY DEPARTMENT HISTORY AND PHYSICAL EXAM      Date: 11/28/2020  Patient Name: Rebeca Grijalva    History of Presenting Illness     Chief Complaint   Patient presents with    Cough     Ambulatory into the ED with c/o congested cough and sore throat x several days. Coughing up blood x this morning.  Hemoptysis         HPI: Rebeca Grijalva, 65 y.o. male presents to the ED with cc of hemoptysis. One episode this morning, approximately 1 teaspoon of blood, bright red. He states he had a dry cough for several months. He has never had productivity. Otherwise, he feels well and has no symptoms. Patient is a previous smoker. He requests referral to pulmonology for further management including evaluation for possible underlying lung cancer. There are no other complaints, changes, or physical findings at this time. PCP: Erick Aggarwal MD    No current facility-administered medications on file prior to encounter. Current Outpatient Medications on File Prior to Encounter   Medication Sig Dispense Refill    amLODIPine (NORVASC) 10 mg tablet Take 1 Tab by mouth daily. 30 Tab 0    carvedilol (COREG) 12.5 mg tablet Take 1 Tab by mouth every twelve (12) hours. (Patient taking differently: Take 25 mg by mouth every twelve (12) hours.) 60 Tab 0    aspirin delayed-release 81 mg tablet Take 81 mg by mouth daily.  therapeutic multivitamin (THERA) tablet Take 1 Tab by mouth daily.  b complex vitamins (B COMPLEX 1) tablet Take 1 Tab by mouth daily.  fish oil-omega-3 fatty acids 340-1,000 mg capsule Take 1 Cap by mouth daily.  atorvastatin (LIPITOR) 20 mg tablet Take 10 mg by mouth nightly.  pantoprazole (PROTONIX) 20 mg tablet Take 20 mg by mouth daily.          Past History     Past Medical History:  Past Medical History:   Diagnosis Date    CAD (coronary artery disease)     Hypertension        Past Surgical History:  Past Surgical History:   Procedure Laterality Date    HX CORONARY ARTERY BYPASS GRAFT         Family History:  History reviewed. No pertinent family history. Social History:  Social History     Tobacco Use    Smoking status: Current Some Day Smoker    Smokeless tobacco: Never Used   Substance Use Topics    Alcohol use: No    Drug use: No       Allergies: Allergies   Allergen Reactions    Egg Hives     \"egg whites\"         Review of Systems   Review of Systems   Constitutional: Negative for chills and fever. HENT: Negative for sore throat. Eyes: Negative for redness. Respiratory: Positive for cough. Negative for shortness of breath. Cardiovascular: Negative for chest pain. Gastrointestinal: Negative for abdominal pain. Genitourinary: Negative for dysuria. Musculoskeletal: Negative for back pain. Neurological: Negative for syncope. Psychiatric/Behavioral: The patient is not nervous/anxious. All other systems reviewed and are negative. Physical Exam   Physical Exam  Vitals signs and nursing note reviewed. Constitutional:       Appearance: Normal appearance. HENT:      Head: Normocephalic and atraumatic. Mouth/Throat:      Mouth: Mucous membranes are moist.   Neck:      Musculoskeletal: Neck supple. Cardiovascular:      Rate and Rhythm: Normal rate and regular rhythm. Pulmonary:      Effort: Pulmonary effort is normal.      Breath sounds: Normal breath sounds. Abdominal:      Palpations: Abdomen is soft. Tenderness: There is no abdominal tenderness. Musculoskeletal:         General: No deformity. Skin:     General: Skin is warm and dry. Neurological:      General: No focal deficit present. Mental Status: He is alert. Psychiatric:         Mood and Affect: Mood normal.         Behavior: Behavior normal.         Diagnostic Study Results     Labs -   No results found for this or any previous visit (from the past 24 hour(s)).     Radiologic Studies -   XR CHEST PA LAT   Final Result   IMPRESSION: Right upper lobe atelectasis. No acute findings. CT Results  (Last 48 hours)    None        CXR Results  (Last 48 hours)               11/28/20 1224  XR CHEST PA LAT Final result    Impression:  IMPRESSION: Right upper lobe atelectasis. No acute findings. Narrative:  EXAM: XR CHEST PA LAT       INDICATION: cough, hemoptysis       COMPARISON: 1/27/2017. FINDINGS: PA and lateral radiographs of the chest demonstrate Linear atelectasis   in the right upper lobe with otherwise clear lungs. There are median sternotomy   wires and surgical clips compatible with prior CABG. The cardiac and mediastinal   contours and pulmonary vascularity are normal. The bones and soft tissues show   degenerative spine changes but otherwise are within normal limits. Medical Decision Making   I am the first provider for this patient. I reviewed the vital signs, available nursing notes, past medical history, past surgical history, family history and social history. Vital Signs-Reviewed the patient's vital signs. Patient Vitals for the past 24 hrs:   Temp Pulse Resp BP SpO2   11/28/20 1200    (!) 140/51 96 %   11/28/20 1154    (!) 153/50 97 %   11/28/20 0959 98 °F (36.7 °C) 69 16 (!) 190/62 97 %         Provider Notes (Medical Decision Making):   Very pleasant, well-appearing 49-year-old presenting to ED with one episode of hemoptysis earlier today. Does not sound like massive hemoptysis. Chest x-ray demonstrates atelectasis, which may suggest pneumonia or rhonchi this, and may be the cause of his symptoms. Otherwise, he looks great, has no respiratory difficulty, otherwise has normal vitals, and I think is safe for discharge. We will plan for antibiotics for this. He will be referred to pulmonology as he is a previous smoker and will likely need further evaluation for lung cancer. This was discussed with patient and he is amenable to this plan. Close return precautions were given.     ED Course: Initial assessment performed. The patients presenting problems have been discussed, and they are in agreement with the care plan formulated and outlined with them. I have encouraged them to ask questions as they arise throughout their visit. Disposition:  dc    PLAN:  1. Discharge Medication List as of 11/28/2020 12:37 PM      START taking these medications    Details   azithromycin (Zithromax TRI-RONALDO) 500 mg tab Take 500 mg po daily, Normal, Disp-3 Tab,R-0         CONTINUE these medications which have NOT CHANGED    Details   amLODIPine (NORVASC) 10 mg tablet Take 1 Tab by mouth daily. , Print, Disp-30 Tab, R-0      carvedilol (COREG) 12.5 mg tablet Take 1 Tab by mouth every twelve (12) hours. , Print, Disp-60 Tab, R-0      aspirin delayed-release 81 mg tablet Take 81 mg by mouth daily. , Historical Med      therapeutic multivitamin (THERA) tablet Take 1 Tab by mouth daily. , Historical Med      b complex vitamins (B COMPLEX 1) tablet Take 1 Tab by mouth daily. , Historical Med      fish oil-omega-3 fatty acids 340-1,000 mg capsule Take 1 Cap by mouth daily. , Historical Med      atorvastatin (LIPITOR) 20 mg tablet Take 10 mg by mouth nightly., Historical Med      pantoprazole (PROTONIX) 20 mg tablet Take 20 mg by mouth daily. , Historical Med           2.    Follow-up Information     Follow up With Specialties Details Why Contact Info    [facility] [address] [address]  [address] [facility] [place] [address]        Return to ED if worse     Diagnosis     Clinical Impression: hemoptysis, atelectasis

## 2020-11-28 NOTE — ED NOTES
Patient ambulatory to room w c/o coughing and having a small amount of blood tinge sputum. Patient denies any chest pain or SOB at this time. Patient is A&O x4 and seated in the chair in his room. 1302- Discharge instructions given to patient by Dr. Henrry Meyer. Verbalized understanding of instructions. Patient discharged without difficulty. Patient discharged in stable condition ambulatory accompanied by daughter.

## 2020-12-08 ENCOUNTER — TRANSCRIBE ORDER (OUTPATIENT)
Dept: SCHEDULING | Age: 70
End: 2020-12-08

## 2020-12-08 DIAGNOSIS — R93.89 ABNORMAL CHEST XRAY: Primary | ICD-10-CM

## 2021-01-08 ENCOUNTER — HOSPITAL ENCOUNTER (OUTPATIENT)
Dept: CT IMAGING | Age: 71
Discharge: HOME | End: 2021-01-08
Attending: INTERNAL MEDICINE
Payer: MEDICARE

## 2021-01-08 DIAGNOSIS — R93.89 ABNORMAL CHEST XRAY: ICD-10-CM

## 2021-01-08 PROCEDURE — 71250 CT THORAX DX C-: CPT

## 2021-12-30 NOTE — TELEPHONE ENCOUNTER
1/20/2017 Cardiac Wellness: Called Mr. 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 Pt states he will return back to cardiac rehab at [facility] and says he knows them very well and has their number. He states he will also call and make a follow up appointment with Dr. Ileene Hashimoto. He has no questions.   Quan Gonzalez RN stated